# Patient Record
Sex: FEMALE | Race: BLACK OR AFRICAN AMERICAN | NOT HISPANIC OR LATINO | Employment: OTHER | URBAN - METROPOLITAN AREA
[De-identification: names, ages, dates, MRNs, and addresses within clinical notes are randomized per-mention and may not be internally consistent; named-entity substitution may affect disease eponyms.]

---

## 2019-11-08 ENCOUNTER — HOSPITAL ENCOUNTER (OUTPATIENT)
Dept: GASTROENTEROLOGY | Facility: HOSPITAL | Age: 69
Setting detail: HOSPITAL OUTPATIENT SURGERY
Discharge: HOME | End: 2019-11-08
Attending: INTERNAL MEDICINE

## 2020-04-13 ENCOUNTER — TELEPHONE CONVERTED (OUTPATIENT)
Dept: GASTROENTEROLOGY | Facility: CLINIC | Age: 70
End: 2020-04-13
Attending: INTERNAL MEDICINE

## 2021-05-12 NOTE — PROGRESS NOTES
"   Quick Note      Patient Name: Rose Marie Galdamez   Patient ID: [id number]   Sex: Female   YOB: 1950    Primary Care Provider: Elizabeth Rodriguez MD   Referring Provider: Elizabeth Rodriguez MD    Visit Date: April 13, 2020    Provider: Sammi Pryor MD   Location: [facility]   Location Address: [address] [place] [address], [address]  [place], [place]  [id number]   Location Phone: [phone]          History Of Present Illness  TELEHEALTH VISIT  Chief Complaint: hemorrhoids   Rose Marie Galdamez is a 70 year old female who is presenting for evaluation via telehealth visit. Verbal consent obtained before beginning visit.   Provider spent 6 minutes with the patient during telehealth visit.   The following staff were present during this visit: Sammi Pryor MD; Tammie Harry MA   Past Medical History/Overview of Patient Symptoms     Ms. Galdamez is a 70 year old female who presents for Telehealth f/u of hemorrhoids.  She would like a refill on Anusol.  She denies constipation.  She reports BM once per day.  No diarrhea.  She c/o occ. rectal bleeding with wiping, better with Anusol.  She feels her bleeding may be worse secondary to excessive wiping that she attributes to OCD.  Pt has been using Anusol daily most days since her colonoscopy.    Colonoscopy (11/8/2019): Moderate pandiverticulosis, grade 1 internal hemorrhoids.  Recall colonoscopy recommended in 10 years.       Vitals  Date Time BP Position Site L\R Cuff Size HR RR TEMP (F) WT  HT  BMI kg/m2 BSA m2 O2 Sat HC       04/13/2020 10:24 AM         135lbs 2oz 4'  5\" 33.82 1.51               Assessment  · Hemorrhoids     455.6/K64.9  · Diverticulosis     562.10/K57.90  · Rectal bleeding     569.3/K62.5    Problems Reconciled  Plan  · Orders  o Physican Telephone evaluation, 5-10 min (51421) - - 04/14/2020  · Medications  o Anusol-HC 2.5 % topical cream with perineal applicator   SIG: apply a thin layer to the affected area(s) by topical route 2 times " per day for 14 days   DISP: (1) Cream with perineal applicator with 0 refills  Prescribed on 04/13/2020     o Medications have been Reconciled  o Transition of Care or Provider Policy  · Instructions  o Plan Of Care:   o Hemorrhoids: will send refill for Anusol. Advised to use no longer than 2 week intervals secondary to potential risks with long term use. Advised that long term use can cause skin breakdown. Pt expressed understanding.  · Disposition  o Call or Return if symptoms worsen or persist.            Electronically Signed by: Sammi Pryor MD -Author on April 14, 2020 08:21:01 AM

## 2021-05-15 VITALS — BODY MASS INDEX: 31.27 KG/M2 | WEIGHT: 135.12 LBS | HEIGHT: 55 IN

## 2021-05-22 ENCOUNTER — TRANSCRIBE ORDERS (OUTPATIENT)
Dept: ADMINISTRATIVE | Facility: HOSPITAL | Age: 71
End: 2021-05-22

## 2021-05-22 DIAGNOSIS — Z78.0 POST-MENOPAUSAL: Primary | ICD-10-CM

## 2021-06-08 ENCOUNTER — HOSPITAL ENCOUNTER (OUTPATIENT)
Dept: BONE DENSITY | Facility: HOSPITAL | Age: 71
Discharge: HOME | End: 2021-06-08
Admitting: FAMILY MEDICINE

## 2021-06-08 DIAGNOSIS — Z78.0 POST-MENOPAUSAL: ICD-10-CM

## 2021-06-08 PROCEDURE — 77080 DXA BONE DENSITY AXIAL: CPT | Performed by: RADIOLOGY

## 2021-06-08 PROCEDURE — 77080 DXA BONE DENSITY AXIAL: CPT

## 2021-06-29 ENCOUNTER — HOSPITAL ENCOUNTER (OUTPATIENT)
Dept: GENERAL RADIOLOGY | Facility: HOSPITAL | Age: 71
Discharge: HOME | End: 2021-06-29
Admitting: FAMILY MEDICINE

## 2021-06-29 ENCOUNTER — TRANSCRIBE ORDERS (OUTPATIENT)
Dept: ADMINISTRATIVE | Facility: HOSPITAL | Age: 71
End: 2021-06-29

## 2021-06-29 DIAGNOSIS — M79.609 PAIN IN EXTREMITY, UNSPECIFIED EXTREMITY: Primary | ICD-10-CM

## 2021-06-29 DIAGNOSIS — M79.609 PAIN IN EXTREMITY, UNSPECIFIED EXTREMITY: ICD-10-CM

## 2021-06-29 PROCEDURE — 72110 X-RAY EXAM L-2 SPINE 4/>VWS: CPT

## 2021-07-16 ENCOUNTER — TRANSCRIBE ORDERS (OUTPATIENT)
Dept: ADMINISTRATIVE | Facility: HOSPITAL | Age: 71
End: 2021-07-16

## 2021-07-16 DIAGNOSIS — M51.36 DEGENERATION OF LUMBAR INTERVERTEBRAL DISC: Primary | ICD-10-CM

## 2021-07-30 ENCOUNTER — HOSPITAL ENCOUNTER (OUTPATIENT)
Dept: MRI IMAGING | Facility: HOSPITAL | Age: 71
Discharge: HOME | End: 2021-07-30
Admitting: FAMILY MEDICINE

## 2021-07-30 DIAGNOSIS — M51.36 DEGENERATION OF LUMBAR INTERVERTEBRAL DISC: ICD-10-CM

## 2021-07-30 PROCEDURE — 72148 MRI LUMBAR SPINE W/O DYE: CPT

## 2021-09-23 ENCOUNTER — OFFICE VISIT (OUTPATIENT)
Dept: NEUROSURGERY | Facility: CLINIC | Age: 71
End: 2021-09-23

## 2021-09-23 VITALS
HEART RATE: 92 BPM | BODY MASS INDEX: 34.09 KG/M2 | WEIGHT: 147.3 LBS | HEIGHT: 55 IN | SYSTOLIC BLOOD PRESSURE: 149 MMHG | DIASTOLIC BLOOD PRESSURE: 72 MMHG

## 2021-09-23 DIAGNOSIS — M47.816 FACET ARTHRITIS OF LUMBAR REGION: ICD-10-CM

## 2021-09-23 DIAGNOSIS — M54.16 LEFT LUMBAR RADICULOPATHY: ICD-10-CM

## 2021-09-23 DIAGNOSIS — M51.36 DDD (DEGENERATIVE DISC DISEASE), LUMBAR: Primary | ICD-10-CM

## 2021-09-23 PROCEDURE — 99203 OFFICE O/P NEW LOW 30 MIN: CPT | Performed by: PHYSICIAN ASSISTANT

## 2021-09-23 RX ORDER — MONTELUKAST SODIUM 10 MG/1
10 TABLET ORAL NIGHTLY
COMMUNITY
Start: 2021-06-20

## 2021-09-23 RX ORDER — TRAMADOL HYDROCHLORIDE 50 MG/1
50 TABLET ORAL EVERY 6 HOURS PRN
COMMUNITY
End: 2022-09-26 | Stop reason: HOSPADM

## 2021-09-23 RX ORDER — ASPIRIN 81 MG/1
81 TABLET ORAL DAILY
COMMUNITY
End: 2022-09-26 | Stop reason: HOSPADM

## 2021-09-23 RX ORDER — POTASSIUM CHLORIDE 750 MG/1
10 TABLET, FILM COATED, EXTENDED RELEASE ORAL DAILY
COMMUNITY

## 2021-09-23 RX ORDER — SERTRALINE HYDROCHLORIDE 100 MG/1
100 TABLET, FILM COATED ORAL DAILY
COMMUNITY

## 2021-09-23 RX ORDER — LORAZEPAM 0.5 MG/1
0.5 TABLET ORAL 2 TIMES DAILY PRN
COMMUNITY
Start: 2021-08-24

## 2021-09-23 RX ORDER — CELECOXIB 200 MG/1
200 CAPSULE ORAL DAILY
COMMUNITY

## 2021-09-23 RX ORDER — LOSARTAN POTASSIUM AND HYDROCHLOROTHIAZIDE 25; 100 MG/1; MG/1
1 TABLET ORAL DAILY
COMMUNITY

## 2021-09-23 RX ORDER — TRAZODONE HYDROCHLORIDE 50 MG/1
50 TABLET ORAL NIGHTLY
COMMUNITY

## 2021-09-23 NOTE — PROGRESS NOTES
"Chief Complaint  Back Pain (EST CARE)    Subjective          Rose Marie Galdamez who is a 71 y.o. year old female who presents to [facility] for Evaluation of the Spine.     The patient complains of pain located in the left leg, specifically the whole left foot.  Patients states the pain has been present for 8 month.  The pain came on gradually.  The pain scaled level is 10.  The pain does radiate. Dermatomes are located on left Lumbar at: below the knee and a non-specific dermatome..  The pain is waxing/waning and described as sharp.  The pain is worse at no particular time of day. Patient states Pain Medication makes the pain better.  Patient states Prolonged Walking makes the pain worse.    Associated Symptoms Include: Tingling  Conservative Interventions Include: Physical Therapy that was ineffective., Pain Medications that were somewhat effective. and NSAIDs that were not very effective.    Was this the result of an injury or accident?: No    History of Previous Spinal Surgery?: No     reports that she has never smoked. She has never used smokeless tobacco.    Review of Systems   Musculoskeletal: Positive for myalgias (left leg pain, right shoulder pain).   Neurological: Positive for weakness (left hand) and numbness (left foot).        Objective   Vital Signs:   /72   Pulse 92   Ht 134.6 cm (52.99\")   Wt 66.8 kg (147 lb 4.8 oz)   BMI 36.88 kg/m²       Physical Exam  Constitutional:       Appearance: Normal appearance. She is obese.   Pulmonary:      Effort: Pulmonary effort is normal.   Musculoskeletal:         General: Tenderness (lumbar spine, left SI joint) present.      Comments: SLR increases pain in left leg   Neurological:      Mental Status: She is alert and oriented to person, place, and time.      Sensory: Sensory deficit (burning sensation to palpation of left foot, diffuse) present.      Motor: Weakness (reduced strength in left leg/foot) present. "      Deep Tendon Reflexes: Reflexes normal.   Psychiatric:         Mood and Affect: Mood normal.         Behavior: Behavior normal.        Neurologic Exam     Mental Status   Oriented to person, place, and time.        Result Review :   I have personally reviewed the lumbar MRI without contrast from 7/30/2021 which shows multilevel degenerative changes most significant at L4-L5 where there is central canal narrowing and left greater than right foraminal narrowing.       Assessment and Plan    Diagnoses and all orders for this visit:    1. DDD (degenerative disc disease), lumbar (Primary)    2. Facet arthritis of lumbar region    Patient would likely benefit from a trial of LESB at L4-L5 for left leg radicular symptoms, will refer to [facility] in [place]. Will follow-up with us after injection therapy to discuss further treatment.      Follow Up   No follow-ups on file.  Patient was given instructions and counseling regarding her condition or for health maintenance advice. Please see specific information pulled into the AVS if appropriate.

## 2021-09-30 ENCOUNTER — TELEPHONE (OUTPATIENT)
Dept: NEUROSURGERY | Facility: CLINIC | Age: 71
End: 2021-09-30

## 2022-03-10 ENCOUNTER — OFFICE VISIT (OUTPATIENT)
Dept: ORTHOPEDIC SURGERY | Facility: CLINIC | Age: 72
End: 2022-03-10

## 2022-03-10 VITALS — WEIGHT: 145 LBS | HEIGHT: 55 IN | BODY MASS INDEX: 33.56 KG/M2

## 2022-03-10 DIAGNOSIS — M16.11 PRIMARY OSTEOARTHRITIS OF RIGHT HIP: Primary | ICD-10-CM

## 2022-03-10 DIAGNOSIS — M25.551 RIGHT HIP PAIN: ICD-10-CM

## 2022-03-10 PROCEDURE — 99203 OFFICE O/P NEW LOW 30 MIN: CPT | Performed by: ORTHOPAEDIC SURGERY

## 2022-03-10 RX ORDER — AMITRIPTYLINE HYDROCHLORIDE 25 MG/1
25 TABLET, FILM COATED ORAL NIGHTLY
COMMUNITY
Start: 2022-02-28

## 2022-03-10 RX ORDER — DIAZEPAM 5 MG/1
TABLET ORAL
COMMUNITY
End: 2022-09-20

## 2022-03-14 NOTE — PROGRESS NOTES
"Chief Complaint  Initial Evaluation of the Right Hip     Subjective      Rose Marie Galdamez presents to [facility] for an evaluation of right hip. Patient has a history of old injuries to her right hip. She has noticed lately she has severe pain with ambulation. Patient has a history of spinal stenos and received lumbar injections in the past that have given her relief. She also reports having neuropathy in her legs. She has difficulty localizing the pain in her hip today.      Allergies   Allergen Reactions   • Penicillins Unknown - High Severity        Social History     Socioeconomic History   • Marital status:    Tobacco Use   • Smoking status: Never Smoker   • Smokeless tobacco: Never Used   Vaping Use   • Vaping Use: Never used   Substance and Sexual Activity   • Alcohol use: Never   • Drug use: Never   • Sexual activity: Yes     Partners: Female, Male     Birth control/protection: None        Review of Systems     Objective   Vital Signs:   Ht 138.4 cm (54.5\")   Wt 65.8 kg (145 lb)   BMI 34.32 kg/m²       Physical Exam  Constitutional:       Appearance: Normal appearance. Patient is well-developed and normal weight.   HENT:      Head: Normocephalic.      Right Ear: Hearing and external ear normal.      Left Ear: Hearing and external ear normal.      Nose: Nose normal.   Eyes:      Conjunctiva/sclera: Conjunctivae normal.   Cardiovascular:      Rate and Rhythm: Normal rate.   Pulmonary:      Effort: Pulmonary effort is normal.      Breath sounds: No wheezing or rales.   Abdominal:      Palpations: Abdomen is soft.      Tenderness: There is no abdominal tenderness.   Musculoskeletal:      Cervical back: Normal range of motion.   Skin:     Findings: No rash.   Neurological:      Mental Status: Patient is alert and oriented to person, place, and time.   Psychiatric:         Mood and Affect: Mood and affect normal.         Judgment: Judgment normal.       Ortho Exam  "     RIGHT HIP: Good tone of hip flexors, hip extensors, hip adductor, hip abductors. Tender hip and pelvic muscles. Severe pain in the hip with flexion to 10 degrees. Calf supple, non-tender, no signs of DVT. Ambulation with a cane. Limping gait.       Procedures      Imaging Results (Most Recent)     None           Result Review :     X-Ray Report:  Right hip(s) X-Ray  Indication: Evaluation of right hip pain   AP and Lateral view(s)  Findings: Severe osteoarthritis of the right hip. No acute fracture or dislocation.   Prior studies available for comparison: no     Assessment and Plan     DX: Right hip osteoarthritis     Discussed treatment plans and diagnosis with the patient. Patient opted to try an intraarticular injection, we will set her up with this and see how she does. May consider a hip replacement in the future.     Discussed surgery. and Call or return if worsening symptoms.    Follow Up     4 weeks.       Patient was given instructions and counseling regarding her condition or for health maintenance advice. Please see specific information pulled into the AVS if appropriate.     Scribed for Robby Roche MD by Irene Trevino.  03/14/22   09:07 EDT    I have personally performed the services described in this document as scribed by the above individual and it is both accurate and complete. Robby Roche MD 03/14/22

## 2022-04-11 ENCOUNTER — HOSPITAL ENCOUNTER (OUTPATIENT)
Dept: INTERVENTIONAL RADIOLOGY/VASCULAR | Facility: HOSPITAL | Age: 72
Discharge: HOME | End: 2022-04-11
Admitting: ORTHOPAEDIC SURGERY

## 2022-04-11 ENCOUNTER — APPOINTMENT (OUTPATIENT)
Dept: INTERVENTIONAL RADIOLOGY/VASCULAR | Facility: HOSPITAL | Age: 72
End: 2022-04-11

## 2022-04-11 DIAGNOSIS — M25.551 RIGHT HIP PAIN: ICD-10-CM

## 2022-04-11 PROCEDURE — 77002 NEEDLE LOCALIZATION BY XRAY: CPT

## 2022-04-11 PROCEDURE — 25010000002 IOPAMIDOL 61 % SOLUTION: Performed by: ORTHOPAEDIC SURGERY

## 2022-04-11 PROCEDURE — 25010000002 METHYLPREDNISOLONE PER 80 MG

## 2022-04-11 RX ORDER — LIDOCAINE HYDROCHLORIDE 20 MG/ML
INJECTION, SOLUTION INFILTRATION; PERINEURAL
Status: COMPLETED
Start: 2022-04-11 | End: 2022-04-11

## 2022-04-11 RX ORDER — METHYLPREDNISOLONE ACETATE 80 MG/ML
INJECTION, SUSPENSION INTRA-ARTICULAR; INTRALESIONAL; INTRAMUSCULAR; SOFT TISSUE
Status: COMPLETED
Start: 2022-04-11 | End: 2022-04-11

## 2022-04-11 RX ORDER — BUPIVACAINE HYDROCHLORIDE 5 MG/ML
INJECTION, SOLUTION EPIDURAL; INTRACAUDAL
Status: COMPLETED
Start: 2022-04-11 | End: 2022-04-11

## 2022-04-11 RX ADMIN — BUPIVACAINE HYDROCHLORIDE 4 ML: 5 INJECTION, SOLUTION EPIDURAL; INTRACAUDAL at 13:39

## 2022-04-11 RX ADMIN — METHYLPREDNISOLONE ACETATE 1 MG: 80 INJECTION, SUSPENSION INTRA-ARTICULAR; INTRALESIONAL; INTRAMUSCULAR; SOFT TISSUE at 13:39

## 2022-04-11 RX ADMIN — IOPAMIDOL 15 ML: 612 INJECTION, SOLUTION INTRATHECAL at 13:38

## 2022-04-11 RX ADMIN — LIDOCAINE HYDROCHLORIDE 10 ML: 20 INJECTION, SOLUTION INFILTRATION; PERINEURAL at 13:35

## 2022-04-26 ENCOUNTER — OFFICE VISIT (OUTPATIENT)
Dept: ORTHOPEDIC SURGERY | Facility: CLINIC | Age: 72
End: 2022-04-26

## 2022-04-26 VITALS — BODY MASS INDEX: 33.56 KG/M2 | WEIGHT: 145 LBS | HEIGHT: 55 IN

## 2022-04-26 DIAGNOSIS — M16.11 PRIMARY OSTEOARTHRITIS OF RIGHT HIP: Primary | ICD-10-CM

## 2022-04-26 PROCEDURE — 99212 OFFICE O/P EST SF 10 MIN: CPT | Performed by: ORTHOPAEDIC SURGERY

## 2022-04-26 NOTE — PROGRESS NOTES
"Chief Complaint  Follow-up of the Right Hip     Subjective      Rose Marie Galdamez presents to [facility] for a follow-up of right hip. Patient has significant right hip osteoarthritis that is treated conservatively. Last visit patient opted for a intra-articular injection, she states this went well. She got some relief from this injection. She hasn't had recurrent pain yet. She points to her groin as her main source of pain prior to the injection.     Allergies   Allergen Reactions   • Penicillins Unknown - High Severity        Social History     Socioeconomic History   • Marital status:    Tobacco Use   • Smoking status: Never Smoker   • Smokeless tobacco: Never Used   Vaping Use   • Vaping Use: Never used   Substance and Sexual Activity   • Alcohol use: Never   • Drug use: Never   • Sexual activity: Yes     Partners: Female, Male     Birth control/protection: None        Review of Systems     Objective   Vital Signs:   Ht 138.4 cm (54.5\")   Wt 65.8 kg (145 lb)   BMI 34.32 kg/m²       Physical Exam  Constitutional:       Appearance: Normal appearance. Patient is well-developed and normal weight.   HENT:      Head: Normocephalic.      Right Ear: Hearing and external ear normal.      Left Ear: Hearing and external ear normal.      Nose: Nose normal.   Eyes:      Conjunctiva/sclera: Conjunctivae normal.   Cardiovascular:      Rate and Rhythm: Normal rate.   Pulmonary:      Effort: Pulmonary effort is normal.      Breath sounds: No wheezing or rales.   Abdominal:      Palpations: Abdomen is soft.      Tenderness: There is no abdominal tenderness.   Musculoskeletal:      Cervical back: Normal range of motion.   Skin:     Findings: No rash.   Neurological:      Mental Status: Patient is alert and oriented to person, place, and time.   Psychiatric:         Mood and Affect: Mood and affect normal.         Judgment: Judgment normal.       Ortho Exam      RIGHT HIP: Ambulation with a " cane. Good tone of hip flexors, hip extensors, hip adductor, hip abductors. Tenderness about the hip and pelvic muscles. Calf soft.       Procedures      Imaging Results (Most Recent)     None           Result Review :         FL Guide For Pain Meds Inj    Result Date: 4/11/2022  Narrative: PROCEDURE: FL GUIDE FOR PAIN MEDS INJECTION MAJOR JOINT  COMPARISON: None  INDICATIONS: Right hip OA.INITIAL PAIN-0. POST INJECTION PAIN-0.5 ML-DEPOMEDROL 80mg/BUPIVACAINE .5%. 1 ML-ISOVUE M 300.  Technique and findings: The patient's medication list was reviewed and documented in the medical record.  After informed consent was obtained., a time-out was performed.  The patient was placed supine on the fluoroscopy table and the right hip was marked and prepped and draped in the usual sterile fashion.  The skin and subcutaneous tissues were anesthetized with lidocaine and under fluoroscopic guidance a 22 gauge needle was advanced into the right hip joint.  Injection of a small amount of contrast confirmed appropriate position.  Next, a mixture of 80 mg of Depo-Medrol and bupivacaine was injected and the needle was removed.  The patient tolerated the procedure well without immediate complication.      Impression:   1. Successful right hip steroid injection.     TEAGAN BROWN MD       Electronically Signed and Approved By: TEAGAN BROWN MD on 4/11/2022 at 14:21                      Assessment and Plan     DX: Right hip osteoarthritis     The intra-articular injection is giving her relief at this time. She wishes to continue conservative measures.     Call or return if worsening symptoms.    Follow Up     PRN.       Patient was given instructions and counseling regarding her condition or for health maintenance advice. Please see specific information pulled into the AVS if appropriate.     Scribed for Robby Roche MD by Irene Trevino.  04/26/22   11:08 EDT    I have personally performed the services described in this document as  scribed by the above individual and it is both accurate and complete. Robby Roche MD 04/26/22

## 2022-05-05 ENCOUNTER — TRANSCRIBE ORDERS (OUTPATIENT)
Dept: LAB | Facility: HOSPITAL | Age: 72
End: 2022-05-05

## 2022-05-05 DIAGNOSIS — Z12.11 ENCOUNTER FOR SCREENING FOR COLORECTAL MALIGNANT NEOPLASM: Primary | ICD-10-CM

## 2022-05-05 DIAGNOSIS — Z12.12 ENCOUNTER FOR SCREENING FOR COLORECTAL MALIGNANT NEOPLASM: Primary | ICD-10-CM

## 2022-06-15 ENCOUNTER — TELEPHONE (OUTPATIENT)
Dept: ORTHOPEDIC SURGERY | Facility: CLINIC | Age: 72
End: 2022-06-15

## 2022-06-15 NOTE — TELEPHONE ENCOUNTER
Provider: LEANNE  Caller: MARISSA WISE   Relationship to Patient: PATIENT  Pharmacy: N/A  Phone Number: [phone]  Reason for Call: PATIENT CALLING TO LET DR PERDOMO KNOW THAT SHE HAS SOME MEDICAL/DENTAL ISSUES TO GET TAKEN CARE OF AND THEN SHE WILL BE SCHEDULING SURGERY  When was the patient last seen: 4.26.22

## 2022-09-01 ENCOUNTER — PREP FOR SURGERY (OUTPATIENT)
Dept: OTHER | Facility: HOSPITAL | Age: 72
End: 2022-09-01

## 2022-09-01 ENCOUNTER — OFFICE VISIT (OUTPATIENT)
Dept: ORTHOPEDIC SURGERY | Facility: CLINIC | Age: 72
End: 2022-09-01

## 2022-09-01 VITALS — WEIGHT: 145 LBS | BODY MASS INDEX: 33.56 KG/M2 | HEART RATE: 68 BPM | OXYGEN SATURATION: 100 % | HEIGHT: 55 IN

## 2022-09-01 DIAGNOSIS — M25.551 RIGHT HIP PAIN: Primary | ICD-10-CM

## 2022-09-01 DIAGNOSIS — M16.11 PRIMARY OSTEOARTHRITIS OF RIGHT HIP: Primary | ICD-10-CM

## 2022-09-01 DIAGNOSIS — M16.11 PRIMARY OSTEOARTHRITIS OF RIGHT HIP: ICD-10-CM

## 2022-09-01 PROBLEM — M16.9 OA (OSTEOARTHRITIS) OF HIP: Status: ACTIVE | Noted: 2022-09-01

## 2022-09-01 PROCEDURE — 99214 OFFICE O/P EST MOD 30 MIN: CPT | Performed by: ORTHOPAEDIC SURGERY

## 2022-09-01 RX ORDER — TRANEXAMIC ACID 10 MG/ML
1000 INJECTION, SOLUTION INTRAVENOUS ONCE
Status: CANCELLED | OUTPATIENT
Start: 2022-09-01 | End: 2022-09-01

## 2022-09-01 RX ORDER — POVIDONE-IODINE 10 MG/ML
SOLUTION TOPICAL ONCE
Status: CANCELLED | OUTPATIENT
Start: 2022-09-01 | End: 2022-09-01

## 2022-09-02 NOTE — PROGRESS NOTES
"Chief Complaint  Follow-up and Pain of the Right Hip     Subjective      Rose Marie Galdamez presents to [facility] for a follow-up of right hip. Patient has severe bone-on-bone osteoarthritis that has been treated with medication and injections. Pain has is no longer tolerable and she is unable to perform ADLs. She states pain with prolonged standing and walking in the groin.     Allergies   Allergen Reactions   • Penicillins Unknown - High Severity        Social History     Socioeconomic History   • Marital status:    Tobacco Use   • Smoking status: Never Smoker   • Smokeless tobacco: Never Used   Vaping Use   • Vaping Use: Never used   Substance and Sexual Activity   • Alcohol use: Never   • Drug use: Never   • Sexual activity: Yes     Partners: Female, Male     Birth control/protection: None        Review of Systems     Objective   Vital Signs:   Pulse 68   Ht 137.2 cm (54\")   Wt 65.8 kg (145 lb)   SpO2 100%   BMI 34.96 kg/m²       Physical Exam  Constitutional:       Appearance: Normal appearance. Patient is well-developed and normal weight.   HENT:      Head: Normocephalic.      Right Ear: Hearing and external ear normal.      Left Ear: Hearing and external ear normal.      Nose: Nose normal.   Eyes:      Conjunctiva/sclera: Conjunctivae normal.   Cardiovascular:      Rate and Rhythm: Normal rate.   Pulmonary:      Effort: Pulmonary effort is normal.      Breath sounds: No wheezing or rales.   Abdominal:      Palpations: Abdomen is soft.      Tenderness: There is no abdominal tenderness.   Musculoskeletal:      Cervical back: Normal range of motion.   Skin:     Findings: No rash.   Neurological:      Mental Status: Patient is alert and oriented to person, place, and time.   Psychiatric:         Mood and Affect: Mood and affect normal.         Judgment: Judgment normal.       Ortho Exam      RIGHT HIP: IR to 10 degrees. ER to 0. Good tone of hip flexors, hip extensors, " hip adductor, hip abductors. Sensation grossly intact. Neurovascular intact.  Tender hip and pelvic muscles. Groin pain with hip motion. Calf soft. ambulation with a cane.       Procedures      Imaging Results (Most Recent)     None           Result Review :{Labs  Result Review  Imaging  Med Tab  Media  Procedures :23}     X-Ray Report:  Right hip(s) X-Ray  Indication: Evaluation of right hip pain   AP and Lateral view(s)  Findings: Bone-on-bone osteoarthritis. No fractures. No dislocations.  Prior studies available for comparison: no             Assessment and Plan     Diagnoses and all orders for this visit:    1. Right hip pain (Primary)  -     XR Hip With or Without Pelvis 2 - 3 View Right; Future    2. Primary osteoarthritis of right hip        Discussed total hip replacement, as patient has failed conservative measures. She understands and wishes to proceed.     Discussed surgery., Risks/benefits discussed with patient including, but not limited to: infection, bleeding, neurovascular damage, malunion, nonunion, aesthetic deformity, need for further surgery, and death., Discussed with patient the implant type being used during surgery and patient understands and desires to proceed. and Surgery pamphlet given.    Follow Up     Post-operatively.       Patient was given instructions and counseling regarding her condition or for health maintenance advice. Please see specific information pulled into the AVS if appropriate.     Scribed for oRbby Roche MD by Irene Trevino.  09/02/22   07:44 EDT    I have personally performed the services described in this document as scribed by the above individual and it is both accurate and complete. Robby Roche MD 09/02/22

## 2022-09-02 NOTE — H&P (VIEW-ONLY)
"Chief Complaint  Follow-up and Pain of the Right Hip     Subjective      Rose Marie Galdamez presents to [facility] for a follow-up of right hip. Patient has severe bone-on-bone osteoarthritis that has been treated with medication and injections. Pain has is no longer tolerable and she is unable to perform ADLs. She states pain with prolonged standing and walking in the groin.     Allergies   Allergen Reactions   • Penicillins Unknown - High Severity        Social History     Socioeconomic History   • Marital status:    Tobacco Use   • Smoking status: Never Smoker   • Smokeless tobacco: Never Used   Vaping Use   • Vaping Use: Never used   Substance and Sexual Activity   • Alcohol use: Never   • Drug use: Never   • Sexual activity: Yes     Partners: Female, Male     Birth control/protection: None        Review of Systems     Objective   Vital Signs:   Pulse 68   Ht 137.2 cm (54\")   Wt 65.8 kg (145 lb)   SpO2 100%   BMI 34.96 kg/m²       Physical Exam  Constitutional:       Appearance: Normal appearance. Patient is well-developed and normal weight.   HENT:      Head: Normocephalic.      Right Ear: Hearing and external ear normal.      Left Ear: Hearing and external ear normal.      Nose: Nose normal.   Eyes:      Conjunctiva/sclera: Conjunctivae normal.   Cardiovascular:      Rate and Rhythm: Normal rate.   Pulmonary:      Effort: Pulmonary effort is normal.      Breath sounds: No wheezing or rales.   Abdominal:      Palpations: Abdomen is soft.      Tenderness: There is no abdominal tenderness.   Musculoskeletal:      Cervical back: Normal range of motion.   Skin:     Findings: No rash.   Neurological:      Mental Status: Patient is alert and oriented to person, place, and time.   Psychiatric:         Mood and Affect: Mood and affect normal.         Judgment: Judgment normal.       Ortho Exam      RIGHT HIP: IR to 10 degrees. ER to 0. Good tone of hip flexors, hip extensors, " hip adductor, hip abductors. Sensation grossly intact. Neurovascular intact.  Tender hip and pelvic muscles. Groin pain with hip motion. Calf soft. ambulation with a cane.       Procedures      Imaging Results (Most Recent)     None           Result Review :     X-Ray Report:  Right hip(s) X-Ray  Indication: Evaluation of right hip pain   AP and Lateral view(s)  Findings: Bone-on-bone osteoarthritis. No fractures. No dislocations.  Prior studies available for comparison: no             Assessment and Plan     Diagnoses and all orders for this visit:    1. Right hip pain (Primary)  -     XR Hip With or Without Pelvis 2 - 3 View Right; Future    2. Primary osteoarthritis of right hip        Discussed total hip replacement, as patient has failed conservative measures. She understands and wishes to proceed.     Discussed surgery., Risks/benefits discussed with patient including, but not limited to: infection, bleeding, neurovascular damage, malunion, nonunion, aesthetic deformity, need for further surgery, and death., Discussed with patient the implant type being used during surgery and patient understands and desires to proceed. and Surgery pamphlet given.    Follow Up     Post-operatively.       Patient was given instructions and counseling regarding her condition or for health maintenance advice. Please see specific information pulled into the AVS if appropriate.     Scribed for Robby Roche MD by Irene Trevino.  09/02/22   07:44 EDT    I have personally performed the services described in this document as scribed by the above individual and it is both accurate and complete. Robby Roche MD 09/02/22

## 2022-09-13 ENCOUNTER — TELEPHONE (OUTPATIENT)
Dept: ORTHOPEDIC SURGERY | Facility: CLINIC | Age: 72
End: 2022-09-13

## 2022-09-13 DIAGNOSIS — Z47.1 AFTERCARE FOLLOWING RIGHT HIP JOINT REPLACEMENT SURGERY: Primary | ICD-10-CM

## 2022-09-13 DIAGNOSIS — Z96.641 AFTERCARE FOLLOWING RIGHT HIP JOINT REPLACEMENT SURGERY: Primary | ICD-10-CM

## 2022-09-13 NOTE — TELEPHONE ENCOUNTER
PATIENT CONTACTED AND REASSURED PAT APPOINTMENT IS ON 9/20/2022 AT 8:00AM, PATIENT VOICED UNDERSTANDING

## 2022-09-13 NOTE — TELEPHONE ENCOUNTER
Hub staff attempted to follow warm transfer process and was unsuccessful     Caller: Rose Marie Galdamez    Relationship to patient: Self    Best call back number:  Patient is needing: PATIENT IS STATING THAT SHE HAS A PAPER STATING HER APPT FOR PRE ADMISSIONS IS AT 10:45 AND SHE IS SHOWING ON THE 20TH THAT THE APPT IS AT 8:00. PLEASE CALL TO ASSIST, THANK YOU!

## 2022-09-20 ENCOUNTER — PRE-ADMISSION TESTING (OUTPATIENT)
Dept: PREADMISSION TESTING | Facility: HOSPITAL | Age: 72
End: 2022-09-20

## 2022-09-20 VITALS
OXYGEN SATURATION: 100 % | DIASTOLIC BLOOD PRESSURE: 72 MMHG | HEART RATE: 76 BPM | WEIGHT: 138.89 LBS | BODY MASS INDEX: 32.14 KG/M2 | RESPIRATION RATE: 18 BRPM | HEIGHT: 55 IN | TEMPERATURE: 98.2 F | SYSTOLIC BLOOD PRESSURE: 124 MMHG

## 2022-09-20 DIAGNOSIS — M16.11 PRIMARY OSTEOARTHRITIS OF RIGHT HIP: ICD-10-CM

## 2022-09-20 LAB
ALBUMIN SERPL-MCNC: 4.4 G/DL (ref 3.5–5.2)
ALBUMIN/GLOB SERPL: 1.5 G/DL
ALP SERPL-CCNC: 96 U/L (ref 39–117)
ALT SERPL W P-5'-P-CCNC: 6 U/L (ref 1–33)
ANION GAP SERPL CALCULATED.3IONS-SCNC: 9.7 MMOL/L (ref 5–15)
AST SERPL-CCNC: 11 U/L (ref 1–32)
BASOPHILS # BLD AUTO: 0.03 10*3/MM3 (ref 0–0.2)
BASOPHILS NFR BLD AUTO: 0.6 % (ref 0–1.5)
BILIRUB SERPL-MCNC: 0.6 MG/DL (ref 0–1.2)
BUN SERPL-MCNC: 11 MG/DL (ref 8–23)
BUN/CREAT SERPL: 18.3 (ref 7–25)
CALCIUM SPEC-SCNC: 9.3 MG/DL (ref 8.6–10.5)
CHLORIDE SERPL-SCNC: 98 MMOL/L (ref 98–107)
CO2 SERPL-SCNC: 29.3 MMOL/L (ref 22–29)
CREAT SERPL-MCNC: 0.6 MG/DL (ref 0.57–1)
DEPRECATED RDW RBC AUTO: 42.1 FL (ref 37–54)
EGFRCR SERPLBLD CKD-EPI 2021: 95.5 ML/MIN/1.73
EOSINOPHIL # BLD AUTO: 0.06 10*3/MM3 (ref 0–0.4)
EOSINOPHIL NFR BLD AUTO: 1.2 % (ref 0.3–6.2)
ERYTHROCYTE [DISTWIDTH] IN BLOOD BY AUTOMATED COUNT: 12.8 % (ref 12.3–15.4)
GLOBULIN UR ELPH-MCNC: 3 GM/DL
GLUCOSE SERPL-MCNC: 96 MG/DL (ref 65–99)
HBA1C MFR BLD: 6 % (ref 4.8–5.6)
HCT VFR BLD AUTO: 34.8 % (ref 34–46.6)
HGB BLD-MCNC: 11.8 G/DL (ref 12–15.9)
IMM GRANULOCYTES # BLD AUTO: 0.01 10*3/MM3 (ref 0–0.05)
IMM GRANULOCYTES NFR BLD AUTO: 0.2 % (ref 0–0.5)
INR PPP: 1.02 (ref 0.86–1.15)
LYMPHOCYTES # BLD AUTO: 1.79 10*3/MM3 (ref 0.7–3.1)
LYMPHOCYTES NFR BLD AUTO: 36.5 % (ref 19.6–45.3)
MCH RBC QN AUTO: 30.7 PG (ref 26.6–33)
MCHC RBC AUTO-ENTMCNC: 33.9 G/DL (ref 31.5–35.7)
MCV RBC AUTO: 90.6 FL (ref 79–97)
MONOCYTES # BLD AUTO: 0.33 10*3/MM3 (ref 0.1–0.9)
MONOCYTES NFR BLD AUTO: 6.7 % (ref 5–12)
NEUTROPHILS NFR BLD AUTO: 2.68 10*3/MM3 (ref 1.7–7)
NEUTROPHILS NFR BLD AUTO: 54.8 % (ref 42.7–76)
NRBC BLD AUTO-RTO: 0 /100 WBC (ref 0–0.2)
PLATELET # BLD AUTO: 278 10*3/MM3 (ref 140–450)
PMV BLD AUTO: 10.1 FL (ref 6–12)
POTASSIUM SERPL-SCNC: 3.4 MMOL/L (ref 3.5–5.2)
PROT SERPL-MCNC: 7.4 G/DL (ref 6–8.5)
PROTHROMBIN TIME: 13.5 SECONDS (ref 11.8–14.9)
RBC # BLD AUTO: 3.84 10*6/MM3 (ref 3.77–5.28)
SODIUM SERPL-SCNC: 137 MMOL/L (ref 136–145)
WBC NRBC COR # BLD: 4.9 10*3/MM3 (ref 3.4–10.8)

## 2022-09-20 PROCEDURE — 85610 PROTHROMBIN TIME: CPT

## 2022-09-20 PROCEDURE — 80053 COMPREHEN METABOLIC PANEL: CPT

## 2022-09-20 PROCEDURE — 85025 COMPLETE CBC W/AUTO DIFF WBC: CPT

## 2022-09-20 PROCEDURE — 83036 HEMOGLOBIN GLYCOSYLATED A1C: CPT

## 2022-09-20 PROCEDURE — 36415 COLL VENOUS BLD VENIPUNCTURE: CPT

## 2022-09-20 PROCEDURE — 93010 ELECTROCARDIOGRAM REPORT: CPT | Performed by: SPECIALIST

## 2022-09-20 PROCEDURE — 93005 ELECTROCARDIOGRAM TRACING: CPT

## 2022-09-20 RX ORDER — ALBUTEROL SULFATE 90 UG/1
2 AEROSOL, METERED RESPIRATORY (INHALATION) EVERY 4 HOURS PRN
COMMUNITY

## 2022-09-20 ASSESSMENT — HOOS JR
HOOS JR SCORE: 17
HOOS JR SCORE: 44.905

## 2022-09-20 NOTE — DISCHARGE INSTRUCTIONS
IMPORTANT INSTRUCTIONS - PRE-ADMISSION TESTING  DO NOT EAT OR CHEW anything after midnight the night before your procedure.    You may have CLEAR liquids up to 3 hours prior to ARRIVAL time.   Take the following medications the morning of your procedure with JUST A SIP OF WATER:  Lorazepam, Sertraline, Tramadol    DO NOT BRING your medications to the hospital with you, UNLESS something has changed since your PRE-Admission Testing appointment.  Hold all vitamins, supplements, and NSAIDS (Non- steroidal anti-inflammatory meds) for one week prior to surgery (you MAY take Tylenol or Acetaminophen).  If you are diabetic, check your blood sugar the morning of your procedure. If it is less than 70 or if you are feeling symptomatic, call the following number for further instructions: [phone]  Use your inhalers/nebulizers as usual, the morning of your procedure. BRING YOUR INHALERS with you.   Bring your CPAP or BIPAP to hospital, ONLY IF YOU WILL BE SPENDING THE NIGHT.   Make sure you have a ride home and have someone who will stay with you the day of your procedure after you go home.  If you have any questions, please call your Pre-Admission Testing NurseYing at [phone].   Per anesthesia request, do not smoke for 24 hours before your procedure or as instructed by your surgeon.       Clear Liquid Diet        Find out when you need to start a clear liquid diet.   Think of “clear liquids” as anything you could read a newspaper through. This includes things like water, broth, sports drinks, or tea WITHOUT any kind of milk or cream.           Once you are told to start a clear liquid diet, only drink these things until 3 hours before arrival to the hospital or when the hospital says to stop. Total volume limitation: 8 oz.       Clear liquids you CAN drink:   Water   Clear broth: beef, chicken, vegetable, or bone broth with nothing in it   Gatorade   Lemonade or Yovani-aid   Soda   Tea, coffee (NO cream or  honey)   Jell-O (without fruit)   Popsicles (without fruit or cream)   Italian ices   Juice without pulp: apple, white, grape   You may use salt, pepper, and sugar    Do NOT drink:   Milk or cream   Soy milk, almond milk, coconut milk, or other non-dairy drinks and   creamers   Milkshakes or smoothies   Tomato juice   Orange juice   Grapefruit juice   Cream soups or any other than broth         Clear Liquid Diet:  Do NOT eat any solid food.  Do NOT eat or suck on mints or candy.  Do NOT chew gum.  Do NOT drink thick liquids like milk or juice with pulp in it.  Do NOT add milk, cream, or anything like soy milk or almond milk to coffee or tea.

## 2022-09-21 LAB — QT INTERVAL: 365 MS

## 2022-09-22 ENCOUNTER — ANESTHESIA EVENT (OUTPATIENT)
Dept: PERIOP | Facility: HOSPITAL | Age: 72
End: 2022-09-22

## 2022-09-22 ENCOUNTER — TELEPHONE (OUTPATIENT)
Dept: ORTHOPEDIC SURGERY | Facility: CLINIC | Age: 72
End: 2022-09-22

## 2022-09-22 NOTE — TELEPHONE ENCOUNTER
Caller: TRICE   Relationship to Patient: SELF     Phone Number: [phone]   Reason for Call: PATIENT CALLING ASKING ABOUT BLOOD WORK RESULTS

## 2022-09-26 ENCOUNTER — ANESTHESIA (OUTPATIENT)
Dept: PERIOP | Facility: HOSPITAL | Age: 72
End: 2022-09-26

## 2022-09-26 ENCOUNTER — APPOINTMENT (OUTPATIENT)
Dept: GENERAL RADIOLOGY | Facility: HOSPITAL | Age: 72
End: 2022-09-26

## 2022-09-26 ENCOUNTER — HOSPITAL ENCOUNTER (OUTPATIENT)
Facility: HOSPITAL | Age: 72
Discharge: HOME HEALTH | End: 2022-09-26
Attending: ORTHOPAEDIC SURGERY | Admitting: ORTHOPAEDIC SURGERY

## 2022-09-26 VITALS
DIASTOLIC BLOOD PRESSURE: 55 MMHG | WEIGHT: 144.62 LBS | HEART RATE: 89 BPM | BODY MASS INDEX: 33.47 KG/M2 | HEIGHT: 55 IN | RESPIRATION RATE: 17 BRPM | SYSTOLIC BLOOD PRESSURE: 130 MMHG | TEMPERATURE: 97.5 F | OXYGEN SATURATION: 97 %

## 2022-09-26 DIAGNOSIS — M16.11 PRIMARY OSTEOARTHRITIS OF RIGHT HIP: ICD-10-CM

## 2022-09-26 DIAGNOSIS — R26.2 DIFFICULTY IN WALKING: ICD-10-CM

## 2022-09-26 DIAGNOSIS — Z78.9 DECREASED ACTIVITIES OF DAILY LIVING (ADL): Primary | ICD-10-CM

## 2022-09-26 PROCEDURE — 63710000001 FERROUS SULFATE 325 (65 FE) MG TABLET: Performed by: ORTHOPAEDIC SURGERY

## 2022-09-26 PROCEDURE — C1776 JOINT DEVICE (IMPLANTABLE): HCPCS | Performed by: ORTHOPAEDIC SURGERY

## 2022-09-26 PROCEDURE — 25010000002 PROPOFOL 10 MG/ML EMULSION: Performed by: NURSE ANESTHETIST, CERTIFIED REGISTERED

## 2022-09-26 PROCEDURE — 73502 X-RAY EXAM HIP UNI 2-3 VIEWS: CPT

## 2022-09-26 PROCEDURE — 25010000002 ONDANSETRON PER 1 MG: Performed by: NURSE ANESTHETIST, CERTIFIED REGISTERED

## 2022-09-26 PROCEDURE — 97530 THERAPEUTIC ACTIVITIES: CPT

## 2022-09-26 PROCEDURE — 25010000002 ROPIVACAINE PER 1 MG: Performed by: ORTHOPAEDIC SURGERY

## 2022-09-26 PROCEDURE — 76000 FLUOROSCOPY <1 HR PHYS/QHP: CPT

## 2022-09-26 PROCEDURE — 94761 N-INVAS EAR/PLS OXIMETRY MLT: CPT

## 2022-09-26 PROCEDURE — A9270 NON-COVERED ITEM OR SERVICE: HCPCS | Performed by: ANESTHESIOLOGY

## 2022-09-26 PROCEDURE — 97116 GAIT TRAINING THERAPY: CPT

## 2022-09-26 PROCEDURE — 25010000002 VANCOMYCIN 5 G RECONSTITUTED SOLUTION: Performed by: ORTHOPAEDIC SURGERY

## 2022-09-26 PROCEDURE — 25010000002 DEXAMETHASONE PER 1 MG: Performed by: NURSE ANESTHETIST, CERTIFIED REGISTERED

## 2022-09-26 PROCEDURE — 63710000001 FAMOTIDINE 20 MG TABLET: Performed by: ORTHOPAEDIC SURGERY

## 2022-09-26 PROCEDURE — 63710000001 ACETAMINOPHEN 500 MG TABLET: Performed by: ORTHOPAEDIC SURGERY

## 2022-09-26 PROCEDURE — 25010000002 FENTANYL CITRATE (PF) 50 MCG/ML SOLUTION: Performed by: NURSE ANESTHETIST, CERTIFIED REGISTERED

## 2022-09-26 PROCEDURE — 97165 OT EVAL LOW COMPLEX 30 MIN: CPT

## 2022-09-26 PROCEDURE — 63710000001 CELECOXIB 100 MG CAPSULE: Performed by: ANESTHESIOLOGY

## 2022-09-26 PROCEDURE — 25010000002 MIDAZOLAM PER 1 MG: Performed by: ANESTHESIOLOGY

## 2022-09-26 PROCEDURE — A9270 NON-COVERED ITEM OR SERVICE: HCPCS | Performed by: ORTHOPAEDIC SURGERY

## 2022-09-26 PROCEDURE — 25010000002 KETOROLAC TROMETHAMINE PER 15 MG: Performed by: ORTHOPAEDIC SURGERY

## 2022-09-26 PROCEDURE — 25010000002 HYDROMORPHONE 1 MG/ML SOLUTION: Performed by: NURSE ANESTHETIST, CERTIFIED REGISTERED

## 2022-09-26 PROCEDURE — 27130 TOTAL HIP ARTHROPLASTY: CPT | Performed by: ORTHOPAEDIC SURGERY

## 2022-09-26 PROCEDURE — 97161 PT EVAL LOW COMPLEX 20 MIN: CPT

## 2022-09-26 PROCEDURE — 97535 SELF CARE MNGMENT TRAINING: CPT

## 2022-09-26 PROCEDURE — 25010000002 MORPHINE SULFATE 10 MG/ML SOLUTION: Performed by: ORTHOPAEDIC SURGERY

## 2022-09-26 PROCEDURE — 25010000002 VANCOMYCIN 1 G RECONSTITUTED SOLUTION: Performed by: NURSE ANESTHETIST, CERTIFIED REGISTERED

## 2022-09-26 PROCEDURE — 25010000002 EPINEPHRINE 1 MG/ML SOLUTION: Performed by: ORTHOPAEDIC SURGERY

## 2022-09-26 PROCEDURE — 94799 UNLISTED PULMONARY SVC/PX: CPT

## 2022-09-26 DEVICE — LINER ACET G7 VIVACIT/E NTRL HXPE SZB 32MM: Type: IMPLANTABLE DEVICE | Site: HIP | Status: FUNCTIONAL

## 2022-09-26 DEVICE — IMPLANTABLE DEVICE: Type: IMPLANTABLE DEVICE | Site: HIP | Status: FUNCTIONAL

## 2022-09-26 DEVICE — SCRW ACET CORT TRILOGY S/TAP 6.5X25: Type: IMPLANTABLE DEVICE | Site: HIP | Status: FUNCTIONAL

## 2022-09-26 DEVICE — TOTAL HIP PRIMARY: Type: IMPLANTABLE DEVICE | Site: HIP | Status: FUNCTIONAL

## 2022-09-26 DEVICE — ADAPT HIP BIOLOX OPTN TYPE1 TPR MIN 6: Type: IMPLANTABLE DEVICE | Site: HIP | Status: FUNCTIONAL

## 2022-09-26 DEVICE — HD FEM/HIP G7 BIOLOX/DELTA OPTN 32MM: Type: IMPLANTABLE DEVICE | Site: HIP | Status: FUNCTIONAL

## 2022-09-26 DEVICE — SHLL ACET G7 PPS LTD/HL TI SZB 46MM: Type: IMPLANTABLE DEVICE | Site: HIP | Status: FUNCTIONAL

## 2022-09-26 RX ORDER — FERROUS SULFATE 325(65) MG
325 TABLET ORAL
Status: DISCONTINUED | OUTPATIENT
Start: 2022-09-26 | End: 2022-09-26 | Stop reason: HOSPADM

## 2022-09-26 RX ORDER — OXYCODONE HYDROCHLORIDE 5 MG/1
5 TABLET ORAL
Status: DISCONTINUED | OUTPATIENT
Start: 2022-09-26 | End: 2022-09-26 | Stop reason: HOSPADM

## 2022-09-26 RX ORDER — BISACODYL 10 MG
10 SUPPOSITORY, RECTAL RECTAL DAILY PRN
Status: DISCONTINUED | OUTPATIENT
Start: 2022-09-26 | End: 2022-09-26 | Stop reason: HOSPADM

## 2022-09-26 RX ORDER — PROMETHAZINE HYDROCHLORIDE 12.5 MG/1
12.5 TABLET ORAL EVERY 6 HOURS PRN
Status: DISCONTINUED | OUTPATIENT
Start: 2022-09-26 | End: 2022-09-26 | Stop reason: HOSPADM

## 2022-09-26 RX ORDER — ONDANSETRON 2 MG/ML
4 INJECTION INTRAMUSCULAR; INTRAVENOUS ONCE AS NEEDED
Status: DISCONTINUED | OUTPATIENT
Start: 2022-09-26 | End: 2022-09-26 | Stop reason: HOSPADM

## 2022-09-26 RX ORDER — ONDANSETRON 2 MG/ML
INJECTION INTRAMUSCULAR; INTRAVENOUS AS NEEDED
Status: DISCONTINUED | OUTPATIENT
Start: 2022-09-26 | End: 2022-09-26 | Stop reason: SURG

## 2022-09-26 RX ORDER — DEXAMETHASONE SODIUM PHOSPHATE 4 MG/ML
INJECTION, SOLUTION INTRA-ARTICULAR; INTRALESIONAL; INTRAMUSCULAR; INTRAVENOUS; SOFT TISSUE AS NEEDED
Status: DISCONTINUED | OUTPATIENT
Start: 2022-09-26 | End: 2022-09-26 | Stop reason: SURG

## 2022-09-26 RX ORDER — ROCURONIUM BROMIDE 10 MG/ML
INJECTION, SOLUTION INTRAVENOUS AS NEEDED
Status: DISCONTINUED | OUTPATIENT
Start: 2022-09-26 | End: 2022-09-26 | Stop reason: SURG

## 2022-09-26 RX ORDER — HYDROCODONE BITARTRATE AND ACETAMINOPHEN 7.5; 325 MG/1; MG/1
2 TABLET ORAL EVERY 4 HOURS PRN
Status: DISCONTINUED | OUTPATIENT
Start: 2022-09-26 | End: 2022-09-26 | Stop reason: HOSPADM

## 2022-09-26 RX ORDER — FAMOTIDINE 20 MG/1
40 TABLET, FILM COATED ORAL DAILY
Status: DISCONTINUED | OUTPATIENT
Start: 2022-09-26 | End: 2022-09-26 | Stop reason: HOSPADM

## 2022-09-26 RX ORDER — ACETAMINOPHEN 325 MG/1
325 TABLET ORAL EVERY 4 HOURS PRN
Status: DISCONTINUED | OUTPATIENT
Start: 2022-09-26 | End: 2022-09-26 | Stop reason: HOSPADM

## 2022-09-26 RX ORDER — CELECOXIB 100 MG/1
200 CAPSULE ORAL ONCE
Status: COMPLETED | OUTPATIENT
Start: 2022-09-26 | End: 2022-09-26

## 2022-09-26 RX ORDER — POVIDONE-IODINE 10 MG/ML
SOLUTION TOPICAL ONCE
Status: COMPLETED | OUTPATIENT
Start: 2022-09-26 | End: 2022-09-26

## 2022-09-26 RX ORDER — LABETALOL HYDROCHLORIDE 5 MG/ML
INJECTION, SOLUTION INTRAVENOUS AS NEEDED
Status: DISCONTINUED | OUTPATIENT
Start: 2022-09-26 | End: 2022-09-26 | Stop reason: SURG

## 2022-09-26 RX ORDER — MIDAZOLAM HYDROCHLORIDE 1 MG/ML
2 INJECTION INTRAMUSCULAR; INTRAVENOUS ONCE
Status: COMPLETED | OUTPATIENT
Start: 2022-09-26 | End: 2022-09-26

## 2022-09-26 RX ORDER — PROMETHAZINE HYDROCHLORIDE 25 MG/1
25 SUPPOSITORY RECTAL ONCE AS NEEDED
Status: DISCONTINUED | OUTPATIENT
Start: 2022-09-26 | End: 2022-09-26 | Stop reason: HOSPADM

## 2022-09-26 RX ORDER — ONDANSETRON 2 MG/ML
4 INJECTION INTRAMUSCULAR; INTRAVENOUS EVERY 6 HOURS PRN
Status: DISCONTINUED | OUTPATIENT
Start: 2022-09-26 | End: 2022-09-26 | Stop reason: HOSPADM

## 2022-09-26 RX ORDER — TRANEXAMIC ACID 10 MG/ML
1000 INJECTION, SOLUTION INTRAVENOUS ONCE
Status: COMPLETED | OUTPATIENT
Start: 2022-09-26 | End: 2022-09-26

## 2022-09-26 RX ORDER — SODIUM CHLORIDE, SODIUM LACTATE, POTASSIUM CHLORIDE, CALCIUM CHLORIDE 600; 310; 30; 20 MG/100ML; MG/100ML; MG/100ML; MG/100ML
100 INJECTION, SOLUTION INTRAVENOUS CONTINUOUS
Status: DISCONTINUED | OUTPATIENT
Start: 2022-09-26 | End: 2022-09-26 | Stop reason: HOSPADM

## 2022-09-26 RX ORDER — NALOXONE HCL 0.4 MG/ML
0.4 VIAL (ML) INJECTION
Status: DISCONTINUED | OUTPATIENT
Start: 2022-09-26 | End: 2022-09-26 | Stop reason: HOSPADM

## 2022-09-26 RX ORDER — ACETAMINOPHEN 500 MG
1000 TABLET ORAL EVERY 8 HOURS
Status: DISCONTINUED | OUTPATIENT
Start: 2022-09-26 | End: 2022-09-26 | Stop reason: HOSPADM

## 2022-09-26 RX ORDER — MAGNESIUM HYDROXIDE 1200 MG/15ML
LIQUID ORAL AS NEEDED
Status: DISCONTINUED | OUTPATIENT
Start: 2022-09-26 | End: 2022-09-26 | Stop reason: HOSPADM

## 2022-09-26 RX ORDER — ENOXAPARIN SODIUM 100 MG/ML
40 INJECTION SUBCUTANEOUS DAILY
Status: DISCONTINUED | OUTPATIENT
Start: 2022-09-27 | End: 2022-09-26 | Stop reason: HOSPADM

## 2022-09-26 RX ORDER — ONDANSETRON 4 MG/1
4 TABLET, FILM COATED ORAL EVERY 6 HOURS PRN
Status: DISCONTINUED | OUTPATIENT
Start: 2022-09-26 | End: 2022-09-26 | Stop reason: HOSPADM

## 2022-09-26 RX ORDER — FENTANYL CITRATE 50 UG/ML
INJECTION, SOLUTION INTRAMUSCULAR; INTRAVENOUS AS NEEDED
Status: DISCONTINUED | OUTPATIENT
Start: 2022-09-26 | End: 2022-09-26 | Stop reason: SURG

## 2022-09-26 RX ORDER — AMOXICILLIN 250 MG
2 CAPSULE ORAL 2 TIMES DAILY
Status: DISCONTINUED | OUTPATIENT
Start: 2022-09-26 | End: 2022-09-26 | Stop reason: HOSPADM

## 2022-09-26 RX ORDER — SODIUM CHLORIDE, SODIUM LACTATE, POTASSIUM CHLORIDE, CALCIUM CHLORIDE 600; 310; 30; 20 MG/100ML; MG/100ML; MG/100ML; MG/100ML
9 INJECTION, SOLUTION INTRAVENOUS CONTINUOUS PRN
Status: DISCONTINUED | OUTPATIENT
Start: 2022-09-26 | End: 2022-09-26 | Stop reason: HOSPADM

## 2022-09-26 RX ORDER — HYDROCODONE BITARTRATE AND ACETAMINOPHEN 7.5; 325 MG/1; MG/1
1 TABLET ORAL EVERY 4 HOURS PRN
Status: DISCONTINUED | OUTPATIENT
Start: 2022-09-26 | End: 2022-09-26 | Stop reason: HOSPADM

## 2022-09-26 RX ORDER — ACETAMINOPHEN 500 MG
1000 TABLET ORAL ONCE
Status: DISCONTINUED | OUTPATIENT
Start: 2022-09-26 | End: 2022-09-26

## 2022-09-26 RX ORDER — PROPOFOL 10 MG/ML
VIAL (ML) INTRAVENOUS AS NEEDED
Status: DISCONTINUED | OUTPATIENT
Start: 2022-09-26 | End: 2022-09-26 | Stop reason: SURG

## 2022-09-26 RX ORDER — VANCOMYCIN HYDROCHLORIDE 1 G/20ML
INJECTION, POWDER, LYOPHILIZED, FOR SOLUTION INTRAVENOUS AS NEEDED
Status: DISCONTINUED | OUTPATIENT
Start: 2022-09-26 | End: 2022-09-26 | Stop reason: SURG

## 2022-09-26 RX ORDER — PROMETHAZINE HYDROCHLORIDE 12.5 MG/1
25 TABLET ORAL ONCE AS NEEDED
Status: DISCONTINUED | OUTPATIENT
Start: 2022-09-26 | End: 2022-09-26 | Stop reason: HOSPADM

## 2022-09-26 RX ORDER — LIDOCAINE HYDROCHLORIDE 20 MG/ML
INJECTION, SOLUTION EPIDURAL; INFILTRATION; INTRACAUDAL; PERINEURAL AS NEEDED
Status: DISCONTINUED | OUTPATIENT
Start: 2022-09-26 | End: 2022-09-26 | Stop reason: SURG

## 2022-09-26 RX ORDER — KETOROLAC TROMETHAMINE 15 MG/ML
15 INJECTION, SOLUTION INTRAMUSCULAR; INTRAVENOUS EVERY 6 HOURS SCHEDULED
Status: DISCONTINUED | OUTPATIENT
Start: 2022-09-26 | End: 2022-09-26 | Stop reason: HOSPADM

## 2022-09-26 RX ORDER — HYDROCODONE BITARTRATE AND ACETAMINOPHEN 7.5; 325 MG/1; MG/1
1-2 TABLET ORAL EVERY 4 HOURS PRN
Qty: 40 TABLET | Refills: 0 | Status: SHIPPED | OUTPATIENT
Start: 2022-09-26 | End: 2022-09-30 | Stop reason: SDUPTHER

## 2022-09-26 RX ORDER — PROMETHAZINE HYDROCHLORIDE 12.5 MG/1
12.5 SUPPOSITORY RECTAL EVERY 6 HOURS PRN
Status: DISCONTINUED | OUTPATIENT
Start: 2022-09-26 | End: 2022-09-26 | Stop reason: HOSPADM

## 2022-09-26 RX ORDER — MEPERIDINE HYDROCHLORIDE 25 MG/ML
12.5 INJECTION INTRAMUSCULAR; INTRAVENOUS; SUBCUTANEOUS
Status: DISCONTINUED | OUTPATIENT
Start: 2022-09-26 | End: 2022-09-26 | Stop reason: HOSPADM

## 2022-09-26 RX ADMIN — LABETALOL 20 MG/4 ML (5 MG/ML) INTRAVENOUS SYRINGE 10 MG: at 08:15

## 2022-09-26 RX ADMIN — FENTANYL CITRATE 50 MCG: 50 INJECTION, SOLUTION INTRAMUSCULAR; INTRAVENOUS at 07:14

## 2022-09-26 RX ADMIN — PROPOFOL 150 MG: 10 INJECTION, EMULSION INTRAVENOUS at 07:14

## 2022-09-26 RX ADMIN — ROCURONIUM BROMIDE 10 MG: 10 INJECTION INTRAVENOUS at 07:57

## 2022-09-26 RX ADMIN — SODIUM CHLORIDE, POTASSIUM CHLORIDE, SODIUM LACTATE AND CALCIUM CHLORIDE 9 ML/HR: 600; 310; 30; 20 INJECTION, SOLUTION INTRAVENOUS at 06:43

## 2022-09-26 RX ADMIN — FENTANYL CITRATE 50 MCG: 50 INJECTION, SOLUTION INTRAMUSCULAR; INTRAVENOUS at 08:15

## 2022-09-26 RX ADMIN — MIDAZOLAM HYDROCHLORIDE 2 MG: 1 INJECTION, SOLUTION INTRAMUSCULAR; INTRAVENOUS at 06:56

## 2022-09-26 RX ADMIN — FENTANYL CITRATE 50 MCG: 50 INJECTION, SOLUTION INTRAMUSCULAR; INTRAVENOUS at 07:38

## 2022-09-26 RX ADMIN — KETOROLAC TROMETHAMINE 15 MG: 15 INJECTION, SOLUTION INTRAMUSCULAR; INTRAVENOUS at 11:47

## 2022-09-26 RX ADMIN — POVIDONE-IODINE 4 APPLICATION: 10 SOLUTION TOPICAL at 06:51

## 2022-09-26 RX ADMIN — ONDANSETRON 4 MG: 2 INJECTION INTRAMUSCULAR; INTRAVENOUS at 09:21

## 2022-09-26 RX ADMIN — ONDANSETRON 4 MG: 2 INJECTION INTRAMUSCULAR; INTRAVENOUS at 07:38

## 2022-09-26 RX ADMIN — HYDROMORPHONE HYDROCHLORIDE 0.5 MG: 1 INJECTION, SOLUTION INTRAMUSCULAR; INTRAVENOUS; SUBCUTANEOUS at 09:21

## 2022-09-26 RX ADMIN — ROCURONIUM BROMIDE 10 MG: 10 INJECTION INTRAVENOUS at 07:36

## 2022-09-26 RX ADMIN — TRANEXAMIC ACID 1000 MG: 10 INJECTION, SOLUTION INTRAVENOUS at 06:56

## 2022-09-26 RX ADMIN — FAMOTIDINE 40 MG: 20 TABLET ORAL at 11:47

## 2022-09-26 RX ADMIN — SUGAMMADEX 200 MG: 100 INJECTION, SOLUTION INTRAVENOUS at 08:48

## 2022-09-26 RX ADMIN — SODIUM CHLORIDE, POTASSIUM CHLORIDE, SODIUM LACTATE AND CALCIUM CHLORIDE 100 ML/HR: 600; 310; 30; 20 INJECTION, SOLUTION INTRAVENOUS at 11:48

## 2022-09-26 RX ADMIN — ROCURONIUM BROMIDE 50 MG: 10 INJECTION INTRAVENOUS at 07:14

## 2022-09-26 RX ADMIN — VANCOMYCIN HYDROCHLORIDE 1 G: 1 INJECTION, POWDER, LYOPHILIZED, FOR SOLUTION INTRAVENOUS at 07:08

## 2022-09-26 RX ADMIN — FENTANYL CITRATE 50 MCG: 50 INJECTION, SOLUTION INTRAMUSCULAR; INTRAVENOUS at 07:56

## 2022-09-26 RX ADMIN — TRANEXAMIC ACID 1000 MG: 10 INJECTION, SOLUTION INTRAVENOUS at 07:08

## 2022-09-26 RX ADMIN — VANCOMYCIN HYDROCHLORIDE 1000 MG: 5 INJECTION, POWDER, LYOPHILIZED, FOR SOLUTION INTRAVENOUS at 06:13

## 2022-09-26 RX ADMIN — LIDOCAINE HYDROCHLORIDE 50 MG: 20 INJECTION, SOLUTION EPIDURAL; INFILTRATION; INTRACAUDAL; PERINEURAL at 07:14

## 2022-09-26 RX ADMIN — CELECOXIB 200 MG: 100 CAPSULE ORAL at 06:51

## 2022-09-26 RX ADMIN — ACETAMINOPHEN 1000 MG: 500 TABLET, FILM COATED ORAL at 11:47

## 2022-09-26 RX ADMIN — FERROUS SULFATE TAB 325 MG (65 MG ELEMENTAL FE) 325 MG: 325 (65 FE) TAB at 11:47

## 2022-09-26 RX ADMIN — DEXAMETHASONE SODIUM PHOSPHATE 4 MG: 4 INJECTION, SOLUTION INTRA-ARTICULAR; INTRALESIONAL; INTRAMUSCULAR; INTRAVENOUS; SOFT TISSUE at 07:38

## 2022-09-26 RX ADMIN — ROCURONIUM BROMIDE 10 MG: 10 INJECTION INTRAVENOUS at 08:10

## 2022-09-26 RX ADMIN — LABETALOL 20 MG/4 ML (5 MG/ML) INTRAVENOUS SYRINGE 10 MG: at 08:00

## 2022-09-26 RX ADMIN — TRANEXAMIC ACID 1000 MG: 10 INJECTION, SOLUTION INTRAVENOUS at 08:35

## 2022-09-26 NOTE — PLAN OF CARE
Goal Outcome Evaluation:        Pt stable throughout shift, vitals have been WNL following surgery. Pt S/P right total hip replacement today, dressing changed at bedside by ortho MD, no drainage noted to Aquacel at time of discharge. Pt has been ambulating well with 1x assist and walker without difficulty. Pt voided following surgery. Walker delivered to pt at bedside prior to discharge. Discussed case with Ortho MD, antibiotic sent with home with pt. Pain well controlled.

## 2022-09-26 NOTE — DISCHARGE INSTRUCTIONS
Leave current dressing (Aquacel) in place until  9/29 , remove and clean with alcohol swabs and replace Aqaucel. Leave in place additional 3 days until 10/2 . Then begin daily dry dressing changes as instructed at discharge until follow up with Ortho MD.     Please see handout provided at discharge for additional instructions.       Additionally, take all medications as prescribed. Complete Eliquis completely before beginning Aspirin.

## 2022-09-26 NOTE — PLAN OF CARE
Goal Outcome Evaluation:  Plan of Care Reviewed With: patient, spouse        Progress: no change  Outcome Evaluation: Patient presents with limitations in self-care, functional transfers, and balance. She would benefit from continued skilled occupational therapy services to maximize independence with ADLs/functional transfers. Home heatlh therapy services recommended upon discharge from hospital.

## 2022-09-26 NOTE — ANESTHESIA POSTPROCEDURE EVALUATION
Patient: Rose Marie Galdamez    Procedure Summary     Date: 09/26/22 Room / Location: [facility] OR 03 / [facility] MAIN OR    Anesthesia Start: 0708 Anesthesia Stop: 0910    Procedure: RIGHT TOTAL HIP ARTHROPLASTY ANTERIOR (Right Hip) Diagnosis:       Primary osteoarthritis of right hip      (Primary osteoarthritis of right hip [M16.11])    Surgeons: Robby Roche MD Provider: Lavell Johnson MD    Anesthesia Type: general ASA Status: 2          Anesthesia Type: general    Vitals  Vitals Value Taken Time   /66 09/26/22 0943   Temp 36.4 °C (97.5 °F) 09/26/22 0946   Pulse 87 09/26/22 0946   Resp 16 09/26/22 0946   SpO2 98 % 09/26/22 0946   Vitals shown include unvalidated device data.        Post Anesthesia Care and Evaluation    Patient location during evaluation: bedside  Patient participation: complete - patient participated  Level of consciousness: awake  Pain management: adequate    Airway patency: patent  Anesthetic complications: No anesthetic complications  PONV Status: none  Cardiovascular status: acceptable  Respiratory status: acceptable  Hydration status: acceptable    Comments: An Anesthesiologist personally participated in the most demanding procedures (including induction and emergence if applicable) in the anesthesia plan, monitored the course of anesthesia administration at frequent intervals and remained physically present and available for immediate diagnosis and treatment of emergencies.

## 2022-09-26 NOTE — OP NOTE
TOTAL HIP ARTHROPLASTY ANTERIOR  Procedure Report    Patient Name:  Rose Marie Galdamez  YOB: 1950    Date of Surgery:  9/26/2022     Indications:  Advanced hip arthritis, failed conservative care    Pre-op Diagnosis:   Primary osteoarthritis of right hip [M16.11]       Post-Op Diagnosis Codes:     * Primary osteoarthritis of right hip [M16.11]    Procedure/CPT® Codes:      Procedure(s):  RIGHT TOTAL HIP ARTHROPLASTY ANTERIOR    Staff:  Surgeon(s):  Robby Roche MD    Assistant: Dora Hill RN; Elza Qiu    Surgical Approach: Hip Direct Anterior ([place])        Anesthesia: General    Estimated Blood Loss: 300 mL    Implants:    Implant Name Type Inv. Item Serial No.  Lot No. LRB No. Used Action   SCRW ACET LACEY TRILOGY S/TAP 6.5X25 - [id number] Implant SCRW ACET LACEY TRILOGY S/TAP 6.5X25  [employer] [id number] Right 1 Implanted   LINER ACET G7 VIVACIT/E NTRL HXPE SZB 32MM - [id number] Implant LINER ACET G7 VIVACIT/E NTRL HXPE SZB 32MM  [employer] [id number] Right 1 Implanted   SHLL ACET G7 PPS LTD/HL TI SZB 46MM - [id number] Implant SHLL ACET G7 PPS LTD/HL TI SZB 46MM  [employer] [id number] Right 1 Implanted   HD FEM/HIP G7 BIOLOX/DELTA OPTN 32MM - [id number] Implant HD FEM/HIP G7 BIOLOX/DELTA OPTN 32MM  [employer] [id number] Right 1 Implanted   STEM FEM/HIP MICROPLASTY TAPERLOC COMPL STD SZ9 - [id number] Implant STEM FEM/HIP MICROPLASTY TAPERLOC COMPL STD SZ9  [employer] [id number] Right 1 Implanted   ADAPT HIP BIOLOX OPTN TYPE1 TPR MIN 6 - [id number] Implant ADAPT HIP BIOLOX OPTN TYPE1 TPR MIN 6  [employer] [id number] Right 1 Implanted       Specimen:          None        Findings: Advanced arthritis    Complications:  None    Description of Procedure: The patient went to the operating room and  underwent anesthesia, received a preoperative antibiotic, was placed on the [place] table and was then prepped and draped in standard fashion. A standard anterior hip  incision was made. The interval was found and retractors were placed. The capsule was then opened. The femoral neck was identified. Retractors were placed around the neck. The femoral neck cut was then made and then the head was removed from the acetabulum. Acetabular retractors were then placed. The labrum was removed. C-arm fluoroscopy was used to help guide the reaming for the acetabulum. This was sequentially reamed and then the appropriate size shell was then inserted, followed by a screw and then the  liner. The bed was raised, the leg was dropped, and femoral exposure was obtained. This was then opened using a rat-tail reamer and then sequentially broached  and then a stem was inserted. Leg lengths were measured after reduction and a ceramic head was then chosen. This was then thoroughly irrigated, tranexamic acid and local were injected, and then closed using #1 Vicryl, 2-0 Vicryl and staples. Sterile dressing was applied. The patient was then taken to recovery in stable condition. There were no complications.    Assistant: Dora Hill RN; Elza Qiu  was responsible for performing the following activities: Retraction, Suction, Irrigation, Closing and Placing Dressing and their skilled assistance was necessary for the success of this case.    Robby Roche MD     Date: 9/26/2022  Time: 08:50 EDT

## 2022-09-26 NOTE — ANESTHESIA PREPROCEDURE EVALUATION
Anesthesia Evaluation     Patient summary reviewed and Nursing notes reviewed   no history of anesthetic complications:  NPO Solid Status: > 8 hours  NPO Liquid Status: > 2 hours           Airway   Mallampati: II  TM distance: >3 FB  Neck ROM: full  No difficulty expected  Dental      Pulmonary - normal exam    breath sounds clear to auscultation  (+) asthma,  Cardiovascular - normal exam  Exercise tolerance: good (4-7 METS)    Rhythm: regular  Rate: normal    (+) hypertension,       Neuro/Psych  (+) psychiatric history,    GI/Hepatic/Renal/Endo - negative ROS     Musculoskeletal     Abdominal    Substance History - negative use     OB/GYN negative ob/gyn ROS         Other   arthritis,      ROS/Med Hx Other:  <4 METS. DECREASED MOBILITY, PAIN. HX HTN, ASTHMA. HX NEUROPATHY. LAST PCP OV 9/15/22. [facility]      Phys Exam Other: CHIPPED UPPER RIGHT LATERAL INCISOR              Anesthesia Plan    ASA 2     general     Reason for not using neuraxial anesthesia or peripheral nerve block: Patient Preference  (Patient understands anesthesia not responsible for dental damage.  Regional anesthesia options discussed; patient refuses regional block.  Pt wants general anesthesia    Pt going to take albuterol, celebrex, ativan this am)  intravenous induction     Anesthetic plan, risks, benefits, and alternatives have been provided, discussed and informed consent has been obtained with: patient.    Use of blood products discussed with patient .   Plan discussed with CRNA.        CODE STATUS:

## 2022-09-26 NOTE — SIGNIFICANT NOTE
09/26/22 1158   Plan   Final Discharge Disposition Code 06 - home with home health care   Final Note Home with [facility]

## 2022-09-26 NOTE — PLAN OF CARE
Goal Outcome Evaluation:  Plan of Care Reviewed With: patient           Outcome Evaluation: Patient demonstrated safety with all mobility tasks and is safe to return home with the assistance of her .  She will be discharged from inpatient physical therapy services at this time as she is being discharged from the hospital.  Recommend follow-up with outpatient physical therapy services to address her decreased strength and range of motion postoperatively

## 2022-09-26 NOTE — THERAPY EVALUATION
Acute Care - Physical Therapy Initial Evaluation   Jaime     Patient Name: Rose Marie Galdamez  : 1950  MRN: [id number]  Today's Date: 2022     Admit date: 2022     Referring Physician: Robby Roche MD     Surgery Date:2022   Procedure(s) (LRB):  RIGHT TOTAL HIP ARTHROPLASTY ANTERIOR (Right)            Visit Dx:     ICD-10-CM ICD-9-CM   1. Decreased activities of daily living (ADL)  Z78.9 V49.89   2. Primary osteoarthritis of right hip  M16.11 715.15   3. Difficulty in walking  R26.2 719.7     Patient Active Problem List   Diagnosis   • OA (osteoarthritis) of hip     Past Medical History:   Diagnosis Date   • Anxiety    • Arthritis    • Asthma    • Depression    • Hemorrhoid    • HTN (hypertension)    • Neuropathy     left leg, from back pain   • Right hip pain      Past Surgical History:   Procedure Laterality Date   • CARPAL TUNNEL RELEASE Bilateral    • CATARACT EXTRACTION Bilateral    • CHOLECYSTECTOMY     • COLONOSCOPY     • ESOPHAGUS SURGERY     • HYSTERECTOMY     • LAPAROSCOPIC TUBAL LIGATION       PT Assessment (last 12 hours)     PT Evaluation and Treatment     Row Name 22 1500          Physical Therapy Time and Intention    Subjective Information no complaints  -GAMALIEL     Document Type evaluation;therapy note (daily note)  -GAMALIEL     Mode of Treatment individual therapy;physical therapy  -GAMALIEL     Patient Effort good  -GAMALIEL     Row Name 22 1500          General Information    Patient Observations alert;cooperative;agree to therapy  -GAMALIEL     Prior Level of Function independent:;all household mobility;community mobility  -GAMALIEL     Equipment Currently Used at Home cane, straight;rollator  -GAMALIEL     Existing Precautions/Restrictions fall;weight bearing  -GAMALIEL     Barriers to Rehab none identified  -GAMALIEL     Row Name 22 1500          Living Environment    Current Living Arrangements home  -GAMALIEL     People in Home spouse  -GAMALIEL     Row Name 22 1500          Range of Motion (ROM)     Range of Motion ROM is WFL  -GAMALIEL     Row Name 09/26/22 1500          Strength (Manual Muscle Testing)    Strength (Manual Muscle Testing) right lower extremity  4-/5  -GAMALIEL     Row Name 09/26/22 1500          Mobility    Extremity Weight-bearing Status right lower extremity  -GAMALIEL     Right Lower Extremity (Weight-bearing Status) weight-bearing as tolerated (WBAT)  -GAMALIEL     Row Name 09/26/22 1500          Bed Mobility    Bed Mobility bed mobility (all) activities;supine-sit  -GAMALIEL     All Activities, [place] (Bed Mobility) contact guard  -GAMALIEL     Supine-Sit [place] (Bed Mobility) minimum assist (75% patient effort)  -GAMALIEL     Row Name 09/26/22 1500          Transfers    Transfers bed-chair transfer;sit-stand transfer  -GAMALIEL     Bed-Chair [place] (Transfers) contact guard  -GAMALIEL     Assistive Device (Bed-Chair Transfers) walker, front-wheeled  -GAMALIEL     Sit-Stand [place] (Transfers) contact guard  -GAMALIEL     Row Name 09/26/22 1500          Sit-Stand Transfer    Assistive Device (Sit-Stand Transfers) walker, front-wheeled  -GAMALIEL     Row Name 09/26/22 1500          Gait/Stairs (Locomotion)    Gait/Stairs Locomotion gait/ambulation assistive device  -GAMALIEL     [place] Level (Gait) contact guard  -GAMALIEL     Assistive Device (Gait) walker, front-wheeled  -GAMALIEL     Ambulated day of surgery or within 4 hours of PACU discharge yes  -GAMALIEL     Distance in Feet (Gait) 165  -GAMALIEL     Negotiation (Stairs) stairs independence  -GAMALIEL     [place] Level (Stairs) contact guard  -GAMALIEL     Handrail Location (Stairs) both sides  -GAMALIEL     Number of Steps (Stairs) 4  -GAMALIEL     Ascending Technique (Stairs) step-to-step  -GAMALIEL     Row Name 09/26/22 1500          Safety Issues, Functional Mobility    Impairments Affecting Function (Mobility) balance;pain;strength  -GAMALIEL     Row Name 09/26/22 1500          Balance    Dynamic Standing Balance contact guard  -GAMALIEL     Position/Device Used, Standing Balance walker, front-wheeled  -GAMALIEL     Row Name              Wound 09/26/22 0802 Right anterior hip Incision    Wound - Properties Group Placement Date: 09/26/22  -SC Placement Time: 0802  -SC Present on Hospital Admission: N  -SC Side: Right  -SC Orientation: anterior  -SC Location: hip  -SC Primary Wound Type: Incision  -SC     Retired Wound - Properties Group Placement Date: 09/26/22  -SC Placement Time: 0802  -SC Present on Hospital Admission: N  -SC Side: Right  -SC Orientation: anterior  -SC Location: hip  -SC Primary Wound Type: Incision  -SC     Retired Wound - Properties Group Date first assessed: 09/26/22  -SC Time first assessed: 0802  -SC Present on Hospital Admission: N  -SC Side: Right  -SC Location: hip  -SC Primary Wound Type: Incision  -SC     Row Name 09/26/22 1500          Plan of Care Review    Plan of Care Reviewed With patient  -GAMALIEL     Outcome Evaluation Patient demonstrated safety with all mobility tasks and is safe to return home with the assistance of her .  She will be discharged from inpatient physical therapy services at this time as she is being discharged from the hospital.  Recommend follow-up with outpatient physical therapy services to address her decreased strength and range of motion postoperatively  -GAMALIEL     Row Name 09/26/22 1500          Therapy Assessment/Plan (PT)    Patient/Family Therapy Goals Statement (PT) Patient wants to have less pain  -GAMALIEL     Criteria for Skilled Interventions Met (PT) skilled treatment is necessary  -GAMALIEL     Therapy Frequency (PT) evaluation only  as pt is d/c from the hospital today  -GAMALIEL     Row Name 09/26/22 1500          PT Evaluation Complexity    History, PT Evaluation Complexity no personal factors and/or comorbidities  -GAMALIEL     Examination of Body Systems (PT Eval Complexity) total of 4 or more elements  -GAMALIEL     Clinical Presentation (PT Evaluation Complexity) stable  -GAMALIEL     Clinical Decision Making (PT Evaluation Complexity) low complexity  -GAMALIEL     Overall Complexity (PT Evaluation Complexity) low  complexity  -GAMALIEL     Row Name 09/26/22 1500          Therapy Plan Review/Discharge Plan (PT)    Therapy Plan Review (PT) evaluation/treatment results reviewed;participants voiced agreement with care plan;participants included;patient  -GAMALIEL           User Key  (r) = Recorded By, (t) = Taken By, (c) = Cosigned By    Initials Name Provider Type    Ying Oropeza, RN Registered Nurse    Viral Ramos PT Physical Therapist                Physical Therapy Education                 Title: PT OT SLP Therapies (Done)     Topic: Physical Therapy (Done)     Point: Mobility training (Done)     Learning Progress Summary           Patient Acceptance, E,TB, VU by GAMALIEL at 9/26/2022 1527                   Point: Precautions (Done)     Learning Progress Summary           Patient Acceptance, E,TB, VU by GAMALIEL at 9/26/2022 1527                               User Key     Initials Effective Dates Name Provider Type Discipline    GAMALIEL 06/03/21 -  Viral Fierro PT Physical Therapist PT              PT Recommendation and Plan  Anticipated Discharge Disposition (PT): home with outpatient therapy services  Therapy Frequency (PT): evaluation only (as pt is d/c from the hospital today)  Plan of Care Reviewed With: patient  Outcome Evaluation: Patient demonstrated safety with all mobility tasks and is safe to return home with the assistance of her .  She will be discharged from inpatient physical therapy services at this time as she is being discharged from the hospital.  Recommend follow-up with outpatient physical therapy services to address her decreased strength and range of motion postoperatively   Outcome Measures     Row Name 09/26/22 1500             How much help from another person do you currently need...    Turning from your back to your side while in flat bed without using bedrails? 3  -GAMALIEL      Moving from lying on back to sitting on the side of a flat bed without bedrails? 3  -GAMALIEL      Moving to and from a bed to a  chair (including a wheelchair)? 3  -GAMALIEL      Standing up from a chair using your arms (e.g., wheelchair, bedside chair)? 3  -GAMALIEL      Climbing 3-5 steps with a railing? 3  -GAMALIEL      To walk in hospital room? 3  -GAMALIEL      AM-PAC 6 Clicks Score (PT) 18  -GAMALIEL              Functional Assessment    Outcome Measure Options AM-PAC 6 Clicks Basic Mobility (PT)  -GAMALIEL            User Key  (r) = Recorded By, (t) = Taken By, (c) = Cosigned By    Initials Name Provider Type    Viral Ramos PT Physical Therapist                 Time Calculation:    PT Charges     Row Name 09/26/22 1519             Time Calculation    PT Received On 09/26/22  -GAMALIEL      PT Goal Re-Cert Due Date 10/05/22  -GAMALIEL              Timed Charges    14449 - Gait Training Minutes  10  -GAMALIEL      72387 - PT Therapeutic Activity Minutes 8  -GAMALIEL              Untimed Charges    PT Eval/Re-eval Minutes 30  -GAMALIEL              Total Minutes    Timed Charges Total Minutes 18  -GAMALIEL      Untimed Charges Total Minutes 30  -GAMALIEL       Total Minutes 48  -GAMALIEL            User Key  (r) = Recorded By, (t) = Taken By, (c) = Cosigned By    Initials Name Provider Type    Viral Ramos PT Physical Therapist              Therapy Charges for Today     Code Description Service Date Service Provider Modifiers Qty    59696940133 HC GAIT TRAINING EA 15 MIN 9/26/2022 Viral Fierro, PT GP 1    89039204738 HC PT EVAL LOW COMPLEXITY 3 9/26/2022 Viral Fierro, PT GP 1          PT G-Codes  Outcome Measure Options: AM-PAC 6 Clicks Basic Mobility (PT)  AM-PAC 6 Clicks Score (PT): 18  AM-PAC 6 Clicks Score (OT): 21    Viral Fierro PT  9/26/2022

## 2022-09-26 NOTE — THERAPY EVALUATION
Patient Name: Rose Marie Galdamez  : 1950    MRN: [id number]                              Today's Date: 2022       Admit Date: 2022    Visit Dx:     ICD-10-CM ICD-9-CM   1. Decreased activities of daily living (ADL)  Z78.9 V49.89   2. Primary osteoarthritis of right hip  M16.11 715.15     Patient Active Problem List   Diagnosis   • OA (osteoarthritis) of hip     Past Medical History:   Diagnosis Date   • Anxiety    • Arthritis    • Asthma    • Depression    • Hemorrhoid    • HTN (hypertension)    • Neuropathy     left leg, from back pain   • Right hip pain      Past Surgical History:   Procedure Laterality Date   • CARPAL TUNNEL RELEASE Bilateral    • CATARACT EXTRACTION Bilateral    • CHOLECYSTECTOMY     • COLONOSCOPY     • ESOPHAGUS SURGERY     • HYSTERECTOMY     • LAPAROSCOPIC TUBAL LIGATION        General Information     Row Name 22 1200 22 1154       OT Time and Intention    Document Type therapy note (daily note)  - evaluation  -    Mode of Treatment individual therapy;occupational therapy  - individual therapy;occupational therapy  -    Row Name 22 1150          General Information    Patient Profile Reviewed yes  -     Prior Level of Function --  (I) with ADLs, ambulated with a STC, has a walk-in shower with shower chair, standard commode and BSC, stands to groom, and no home O2.  -     Existing Precautions/Restrictions weight bearing  WBAT RLE  -     Barriers to Rehab none identified  -     Row Name 22 1154          Occupational Profile    Reason for Services/Referral (Occupational Profile) Patient is a 72 year old female who is currently status post right total hip replacement with anterior approach on 2022. Occupational therapy consulted due to recent decline in ADLs/functional transfers. No previous occupational therapy services for current condition.  -     Row Name 22 1154          Living Environment    People in Home  spouse  -     Row Name 09/26/22 1154          Home Main Entrance    Number of Stairs, Main Entrance one  -     Row Name 09/26/22 1154          Cognition    Orientation Status (Cognition) oriented x 4  -     Row Name 09/26/22 1154          Safety Issues, Functional Mobility    Impairments Affecting Function (Mobility) balance  -           User Key  (r) = Recorded By, (t) = Taken By, (c) = Cosigned By    Initials Name Provider Type     Lamar Liu OT Occupational Therapist                 Mobility/ADL's     Row Name 09/26/22 1200 09/26/22 1156       Bed Mobility    Comment, (Bed Mobility) Patient upright and seated in recliner upon therapist arrival.  -LF Patient upright and seated in recliner upon therapist arrival.  -    Row Name 09/26/22 1200 09/26/22 1156       Transfers    Transfers sit-stand transfer;stand-sit transfer  -LF sit-stand transfer;stand-sit transfer  -LF    Sit-Stand [place] (Transfers) contact guard  -LF contact guard  -LF    Stand-Sit [place] (Transfers) contact guard  -LF contact guard  -    Row Name 09/26/22 1200 09/26/22 1156       Sit-Stand Transfer    Assistive Device (Sit-Stand Transfers) walker, front-wheeled  -LF walker, front-wheeled  -LF    Row Name 09/26/22 1200 09/26/22 1156       Stand-Sit Transfer    Assistive Device (Stand-Sit Transfers) walker, front-wheeled  -LF walker, front-wheeled  -LF    Row Name 09/26/22 1200 09/26/22 1156       Activities of Daily Living    BADL Assessment/Intervention upper body dressing;lower body dressing  -LF bathing;upper body dressing;lower body dressing;grooming;feeding;toileting  -    Row Name 09/26/22 1200 09/26/22 1156       Mobility    Extremity Weight-bearing Status right lower extremity  -LF right lower extremity  -LF    Right Lower Extremity (Weight-bearing Status) weight-bearing as tolerated (WBAT)  -LF weight-bearing as tolerated (WBAT)  -    Row Name 09/26/22 1156          Bathing Assessment/Intervention     [place] Level (Bathing) bathing skills;upper body;set up;lower body;minimum assist (75% patient effort);contact guard assist  -     Row Name 09/26/22 1200 09/26/22 1156       Upper Body Dressing Assessment/Training    [place] Level (Upper Body Dressing) upper body dressing skills;don;bra/undergarment;pull-over garment;set up  - upper body dressing skills;set up  -LF    Position (Upper Body Dressing) unsupported sitting  -LF --    Row Name 09/26/22 1200 09/26/22 1156       Lower Body Dressing Assessment/Training    [place] Level (Lower Body Dressing) lower body dressing skills;don;pants/bottoms;shoes/slippers;doff;socks;contact guard assist;minimum assist (75% patient effort)  -LF lower body dressing skills;contact guard assist;minimum assist (75% patient effort)  -LF    Position (Lower Body Dressing) unsupported sitting;supported standing  -LF --    Comment, (Lower Body Dressing) Educated patient on lower body adaptive dressing technique, verified learning via teachback.  - --    Row Name 09/26/22 1156          Grooming Assessment/Training    [place] Level (Grooming) grooming skills;set up  -[facility] Name 09/26/22 1156          Self-Feeding Assessment/Training    [place] Level (Feeding) feeding skills;set up  -[facility] Name 09/26/22 1156          Toileting Assessment/Training    [place] Level (Toileting) toileting skills;contact guard assist  -           User Key  (r) = Recorded By, (t) = Taken By, (c) = Cosigned By    Initials Name Provider Type     Lamar Liu OT Occupational Therapist               Obj/Interventions     Row Name 09/26/22 1157          Sensory Assessment (Somatosensory)    Sensory Assessment (Somatosensory) UE sensation intact  -[facility] Name 09/26/22 1157          Vision Assessment/Intervention    Visual Impairment/Limitations WFL;corrective lenses for reading  -[facility] Name 09/26/22 1157          Range of Motion Comprehensive    General  Range of Motion bilateral upper extremity ROM WFL  -LF     Row Name 09/26/22 1157          Strength Comprehensive (MMT)    Comment, General Manual Muscle Testing (MMT) Assessment 5/5 BUEs  -LF     Row Name 09/26/22 1201 09/26/22 1157       Motor Skills    Motor Skills functional endurance  -LF coordination;functional endurance  -LF    Coordination -- WFL  -LF    Functional Endurance Good for BADLs  -LF Good for BADLs  -LF    Row Name 09/26/22 1201 09/26/22 1157       Balance    Balance Assessment sitting dynamic balance;standing dynamic balance  -LF sitting dynamic balance;standing dynamic balance  -LF    Dynamic Sitting Balance independent  -LF independent  -LF    Position, Sitting Balance unsupported;sitting in chair  -LF unsupported;sitting in chair  -LF    Dynamic Standing Balance contact guard  -LF contact guard  -LF    Position/Device Used, Standing Balance supported;walker, front-wheeled  -LF supported;walker, front-wheeled  -LF    Balance Interventions sitting;standing;sit to stand;supported;dynamic;occupation based/functional task;weight shifting activity  -LF --          User Key  (r) = Recorded By, (t) = Taken By, (c) = Cosigned By    Initials Name Provider Type    LF Lamar Liu OT Occupational Therapist               Goals/Plan     Row Name 09/26/22 1159          Bed Mobility Goal 1 (OT)    Activity/Assistive Device (Bed Mobility Goal 1, OT) bed mobility activities, all  -LF     [place] Level/Cues Needed (Bed Mobility Goal 1, OT) modified independence  -LF     Time Frame (Bed Mobility Goal 1, OT) long term goal (LTG);10 days  -     Row Name 09/26/22 1159          Transfer Goal 1 (OT)    Activity/Assistive Device (Transfer Goal 1, OT) transfers, all;walker, rolling  -LF     [place] Level/Cues Needed (Transfer Goal 1, OT) modified independence  -LF     Time Frame (Transfer Goal 1, OT) long term goal (LTG);10 days  -     Row Name 09/26/22 1159          Bathing Goal 1 (OT)     Activity/Device (Bathing Goal 1, OT) bathing skills, all;lower body bathing  -LF     [place] Level/Cues Needed (Bathing Goal 1, OT) modified independence  -LF     Time Frame (Bathing Goal 1, OT) long term goal (LTG);10 days  -LF     Row Name 09/26/22 1159          Dressing Goal 1 (OT)    Activity/Device (Dressing Goal 1, OT) dressing skills, all;lower body dressing  -LF     [place]/Cues Needed (Dressing Goal 1, OT) modified independence  -LF     Time Frame (Dressing Goal 1, OT) long term goal (LTG);10 days  -LF     Row Name 09/26/22 1159          Toileting Goal 1 (OT)    Activity/Device (Toileting Goal 1, OT) toileting skills, all  -LF     [place] Level/Cues Needed (Toileting Goal 1, OT) modified independence  -LF     Time Frame (Toileting Goal 1, OT) long term goal (LTG);10 days  -LF     Row Name 09/26/22 1159          Therapy Assessment/Plan (OT)    Planned Therapy Interventions (OT) activity tolerance training;patient/caregiver education/training;BADL retraining;functional balance retraining;occupation/activity based interventions;transfer/mobility retraining  -LF           User Key  (r) = Recorded By, (t) = Taken By, (c) = Cosigned By    Initials Name Provider Type     Lamar Liu, OT Occupational Therapist               Clinical Impression     Row Name 09/26/22 1156          Pain Assessment    Additional Documentation Pain Scale: FACES Pre/Post-Treatment (Group)  -     Row Name 09/26/22 5914          Pain Scale: FACES Pre/Post-Treatment    Pain: FACES Scale, Pretreatment 2-->hurts little bit  -LF     Posttreatment Pain Rating 2-->hurts little bit  -LF     Pain Location - Side/Orientation Right  -LF     Pain Location - hip  -LF     Row Name 09/26/22 7288          Plan of Care Review    Plan of Care Reviewed With patient;spouse  -LF     Progress no change  -LF     Outcome Evaluation Patient presents with limitations in self-care, functional transfers, and balance. She would benefit from  continued skilled occupational therapy services to maximize independence with ADLs/functional transfers. Home heat therapy services recommended upon discharge from hospital.  -     Row Name 09/26/22 1158          Therapy Assessment/Plan (OT)    Patient/Family Therapy Goal Statement (OT) To walk with less pain.  -     Rehab Potential (OT) good, to achieve stated therapy goals  -     Criteria for Skilled Therapeutic Interventions Met (OT) yes;meets criteria;skilled treatment is necessary  -     Therapy Frequency (OT) 5 times/wk  -     Row Name 09/26/22 1158          Therapy Plan Review/Discharge Plan (OT)    Equipment Needs Upon Discharge (OT) walker, rolling  -     Anticipated Discharge Disposition (OT) home with home health  -     Row Name 09/26/22 1158          Vital Signs    O2 Delivery Pre Treatment nasal cannula  -LF     O2 Delivery Intra Treatment nasal cannula  -LF     O2 Delivery Post Treatment nasal cannula  -LF           User Key  (r) = Recorded By, (t) = Taken By, (c) = Cosigned By    Initials Name Provider Type     Lamar Liu, OT Occupational Therapist               Outcome Measures     Row Name 09/26/22 1159          How much help from another is currently needed...    Putting on and taking off regular lower body clothing? 3  -LF     Bathing (including washing, rinsing, and drying) 3  -LF     Toileting (which includes using toilet bed pan or urinal) 3  -LF     Putting on and taking off regular upper body clothing 4  -LF     Taking care of personal grooming (such as brushing teeth) 4  -LF     Eating meals 4  -LF     AM-PAC 6 Clicks Score (OT) 21  -LF     Row Name 09/26/22 1045 09/26/22 1014       How much help from another person do you currently need...    Turning from your back to your side while in flat bed without using bedrails? 3  -CHAIM 3  -CHAIM    Moving from lying on back to sitting on the side of a flat bed without bedrails? 3  -CHAIM 3  -CHAIM    Moving to and from a bed to a chair  (including a wheelchair)? 3  -CHAIM 3  -CHAIM    Standing up from a chair using your arms (e.g., wheelchair, bedside chair)? 3  -CHAIM 3  -CHAIM    Climbing 3-5 steps with a railing? 3  -CHAIM 3  -CHAIM    To walk in hospital room? 3  -CHAIM 3  -CHAIM    AM-PAC 6 Clicks Score (PT) 18  -CHAIM 18  -CHAIM    Highest level of mobility 6 --> Walked 10 steps or more  -CHAIM 6 --> Walked 10 steps or more  -CHAIM    Row Name 09/26/22 1159          Functional Assessment    Outcome Measure Options AM-PAC 6 Clicks Daily Activity (OT);Optimal Instrument  -LF     Row Name 09/26/22 1159          Optimal Instrument    Optimal Instrument Optimal - 3  -LF     Bending/Stooping 2  -LF     Standing 2  -LF     Reaching 1  -LF     From the list, choose the 3 activities you would most like to be able to do without any difficulty Bending/stooping;Standing;Reaching  -LF     Total Score Optimal - 3 5  -LF           User Key  (r) = Recorded By, (t) = Taken By, (c) = Cosigned By    Initials Name Provider Type    Kelin Vaughan, RN Registered Nurse    Lamar Schumacher, OT Occupational Therapist                Occupational Therapy Education                 Title: PT OT SLP Therapies (Done)     Topic: Occupational Therapy (Done)     Point: ADL training (Done)     Description:   Instruct learner(s) on proper safety adaptation and remediation techniques during self care or transfers.   Instruct in proper use of assistive devices.              Learning Progress Summary           Patient Acceptance, E,TB, VU by  at 9/26/2022 1159                   Point: Precautions (Done)     Description:   Instruct learner(s) on prescribed precautions during self-care and functional transfers.              Learning Progress Summary           Patient Acceptance, E,TB, VU by  at 9/26/2022 1159                   Point: Body mechanics (Done)     Description:   Instruct learner(s) on proper positioning and spine alignment during self-care, functional mobility activities and/or exercises.               Learning Progress Summary           Patient Acceptance, E,TB, VU by  at 9/26/2022 1159                               User Key     Initials Effective Dates Name Provider Type Discipline     06/16/21 -  Lamar Liu OT Occupational Therapist OT              OT Recommendation and Plan  Planned Therapy Interventions (OT): activity tolerance training, patient/caregiver education/training, BADL retraining, functional balance retraining, occupation/activity based interventions, transfer/mobility retraining  Therapy Frequency (OT): 5 times/wk  Plan of Care Review  Plan of Care Reviewed With: patient, spouse  Progress: no change  Outcome Evaluation: Patient presents with limitations in self-care, functional transfers, and balance. She would benefit from continued skilled occupational therapy services to maximize independence with ADLs/functional transfers. Home heatlh therapy services recommended upon discharge from hospital.     Time Calculation:    Time Calculation- OT     Row Name 09/26/22 1200             Time Calculation- OT    OT Received On 09/26/22  -LF      OT Goal Re-Cert Due Date 10/05/22  -LF              Timed Charges    96081 - OT Therapeutic Activity Minutes 10  -LF      07485 - OT Self Care/Mgmt Minutes 20  -LF              Untimed Charges    OT Eval/Re-eval Minutes 25  -LF              Total Minutes    Timed Charges Total Minutes 30  -LF      Untimed Charges Total Minutes 25  -LF       Total Minutes 55  -LF            User Key  (r) = Recorded By, (t) = Taken By, (c) = Cosigned By    Initials Name Provider Type     Lamar Liu OT Occupational Therapist              Therapy Charges for Today     Code Description Service Date Service Provider Modifiers Qty    06207172588 HC OT THERAPEUTIC ACT EA 15 MIN 9/26/2022 Lamar Liu OT GO 1    19327708151 HC OT SELF CARE/MGMT/TRAIN EA 15 MIN 9/26/2022 Lamar Liu OT GO 1    86242152417 HC OT EVAL LOW COMPLEXITY 2 9/26/2022 Alexa  Lamar, OT GO 1               Lamar Liu, FIDELIA  9/26/2022

## 2022-09-30 DIAGNOSIS — Z78.9 DECREASED ACTIVITIES OF DAILY LIVING (ADL): ICD-10-CM

## 2022-09-30 RX ORDER — HYDROCODONE BITARTRATE AND ACETAMINOPHEN 7.5; 325 MG/1; MG/1
1-2 TABLET ORAL EVERY 4 HOURS PRN
Qty: 40 TABLET | Refills: 0 | Status: SHIPPED | OUTPATIENT
Start: 2022-09-30

## 2022-09-30 NOTE — TELEPHONE ENCOUNTER
Patient called requesting a refill on pain medication. Pharmacy [facility].

## 2022-10-04 ENCOUNTER — TELEPHONE (OUTPATIENT)
Dept: ORTHOPEDIC SURGERY | Facility: CLINIC | Age: 72
End: 2022-10-04

## 2022-10-04 NOTE — TELEPHONE ENCOUNTER
RECEIVED A MESSAGE FROM [facility] ON MY PHONE ABOUT PATIENT'S PULSE RATE BEING  WHICH IS OUT OF RANGE FOR NORMAL FOR [facility].  PATIENT IS NOT HAVING SYMPTOMS.  I CALLED AND SPOKE WITH PATIENT AND SHE STATES SHE IS DOING FINE AND FEELING BETTER. SHE WAS ADVISED TO CALL DR PAZ IF SHE HAS ISSUES AND IF PULSE CONTINUES TO BE ELEVATED.

## 2022-10-11 ENCOUNTER — OFFICE VISIT (OUTPATIENT)
Dept: ORTHOPEDIC SURGERY | Facility: CLINIC | Age: 72
End: 2022-10-11

## 2022-10-11 VITALS — WEIGHT: 144 LBS | OXYGEN SATURATION: 96 % | BODY MASS INDEX: 33.33 KG/M2 | HEIGHT: 55 IN | HEART RATE: 62 BPM

## 2022-10-11 DIAGNOSIS — Z96.641 AFTERCARE FOLLOWING RIGHT HIP JOINT REPLACEMENT SURGERY: Primary | ICD-10-CM

## 2022-10-11 DIAGNOSIS — Z47.1 AFTERCARE FOLLOWING RIGHT HIP JOINT REPLACEMENT SURGERY: Primary | ICD-10-CM

## 2022-10-11 PROCEDURE — 99024 POSTOP FOLLOW-UP VISIT: CPT | Performed by: ORTHOPAEDIC SURGERY

## 2022-10-11 NOTE — PROGRESS NOTES
"Chief Complaint  Follow-up and Pain of the Right Hip     Subjective      Rose Marie Galdamez presents to [facility] for a follow-up of right hip. Patient is s/p right total hip arthroplasty performed on 9/26/22. She reports attending therapy, this appears to be going well. Patient denies any groin pain today. She states that her incision has been doing well.     Patient has concern for her hands today. She has difficulty with gripping with her hands. She noticed this post-operatively. Anesthesia should not affect this or any procedure performed.     Allergies   Allergen Reactions   • Penicillins Rash        Social History     Socioeconomic History   • Marital status:    Tobacco Use   • Smoking status: Never   • Smokeless tobacco: Never   Vaping Use   • Vaping Use: Never used   Substance and Sexual Activity   • Alcohol use: Never   • Drug use: Never   • Sexual activity: Yes     Partners: Female, Male     Birth control/protection: None        Review of Systems     Objective   Vital Signs:   Pulse 62   Ht 134.6 cm (53\")   Wt 65.3 kg (144 lb)   SpO2 96%   BMI 36.04 kg/m²       Physical Exam  Constitutional:       Appearance: Normal appearance. Patient is well-developed and normal weight.   HENT:      Head: Normocephalic.      Right Ear: Hearing and external ear normal.      Left Ear: Hearing and external ear normal.      Nose: Nose normal.   Eyes:      Conjunctiva/sclera: Conjunctivae normal.   Cardiovascular:      Rate and Rhythm: Normal rate.   Pulmonary:      Effort: Pulmonary effort is normal.      Breath sounds: No wheezing or rales.   Abdominal:      Palpations: Abdomen is soft.      Tenderness: There is no abdominal tenderness.   Musculoskeletal:      Cervical back: Normal range of motion.   Skin:     Findings: No rash.   Neurological:      Mental Status: Patient is alert and oriented to person, place, and time.   Psychiatric:         Mood and Affect: Mood and affect " normal.         Judgment: Judgment normal.       Ortho Exam      RIGHT HIP: Ambulation with a cane. Surgical incision is well healing, no infection signs or drainage. Good tone of hip flexors, hip extensors, hip adductor, hip abductors. No groin pain with ambulation. Calf supple, non-tender, no signs of DVT. Sensation grossly intact. Neurovascular intact.        Procedures      Imaging Results (Most Recent)     Procedure Component Value Units Date/Time    XR Hip With or Without Pelvis 2 - 3 View Right [[id number]] Resulted: 10/11/22 0954     Updated: 10/11/22 0958           Result Review :     X-Ray Report:  Right hip(s) X-Ray  Indication: Evaluation of right hip pain   AP and Lateral view(s)  Findings: Intact right total hip arthroplasty with no evidence of wearing or loosening.   Prior studies available for comparison: yes         Assessment and Plan     Diagnoses and all orders for this visit:    1. Aftercare following right hip joint replacement surgery (Primary)  -     XR Hip With or Without Pelvis 2 - 3 View Right        Patient has several questions for me today, these were all answered to patient's satisfaction.   Avoid submerging the incision in water. Keep incision water clean and dry.   Continue therapy.   Repeat films next visit.     Call or return if worsening symptoms.    Follow Up     4 weeks       Patient was given instructions and counseling regarding her condition or for health maintenance advice. Please see specific information pulled into the AVS if appropriate.     Scribed for Robby Roche MD by Irene Trevino.  10/11/22   09:37 EDT    I have personally performed the services described in this document as scribed by the above individual and it is both accurate and complete. Robby Roche MD 10/11/22

## 2022-10-12 ENCOUNTER — TELEPHONE (OUTPATIENT)
Dept: ORTHOPEDICS | Facility: OTHER | Age: 72
End: 2022-10-12

## 2022-10-12 NOTE — TELEPHONE ENCOUNTER
Called left message for the patient stating that Dr. Roche wants her to wait for a couple more weeks before she can drive.

## 2022-10-12 NOTE — TELEPHONE ENCOUNTER
Caller: Rose Marie Galdamez    Relationship: Self    Best call back number: [phone]    What is the best time to reach you: BETWEEN 6AM-6PM    Who are you requesting to speak with (clinical staff, provider,  specific staff member): CLINICAL    Do you require a callback: YES - PT IS NEEDING TO KNOW WHEN SHE CAN DRIVE?     PT IS ALSO WANTING TO LET DR PERDOMO KNOW THAT SHE WILL ATTENDING PT BUT IT HAS TO BE ON HER SCHEDULE AS HER DAUGHTER HAS BONE CANCER AND SHE WILL NEED TO BE THERE FOR HER DAUGHTER AS WELL.    PLEASE CALL PATIENT. THANK  YOU

## 2022-11-08 ENCOUNTER — OFFICE VISIT (OUTPATIENT)
Dept: ORTHOPEDIC SURGERY | Facility: CLINIC | Age: 72
End: 2022-11-08

## 2022-11-08 VITALS — HEIGHT: 55 IN | WEIGHT: 144 LBS | BODY MASS INDEX: 33.33 KG/M2

## 2022-11-08 DIAGNOSIS — Z47.1 AFTERCARE FOLLOWING RIGHT HIP JOINT REPLACEMENT SURGERY: Primary | ICD-10-CM

## 2022-11-08 DIAGNOSIS — Z96.641 AFTERCARE FOLLOWING RIGHT HIP JOINT REPLACEMENT SURGERY: Primary | ICD-10-CM

## 2022-11-08 PROCEDURE — 99024 POSTOP FOLLOW-UP VISIT: CPT | Performed by: ORTHOPAEDIC SURGERY

## 2022-11-08 NOTE — PROGRESS NOTES
"Chief Complaint  Pain and Follow-up of the Right Hip     Subjective      Rose Marie Galdamez presents to [facility] for follow up for the right hip. Patient is s/p right total hip arthroplasty performed on 9/26/22. She reports attending therapy, this appears to be going well. Patient denies any groin pain today. She states that her incision has been doing well.        Allergies   Allergen Reactions   • Penicillins Rash        Social History     Socioeconomic History   • Marital status:    Tobacco Use   • Smoking status: Never   • Smokeless tobacco: Never   Vaping Use   • Vaping Use: Never used   Substance and Sexual Activity   • Alcohol use: Never   • Drug use: Never   • Sexual activity: Yes     Partners: Female, Male     Birth control/protection: None        Review of Systems     Objective   Vital Signs:   Ht 134.6 cm (53\")   Wt 65.3 kg (144 lb)   BMI 36.04 kg/m²       Physical Exam  Constitutional:       Appearance: Normal appearance. Patient is well-developed and normal weight.   HENT:      Head: Normocephalic.      Right Ear: Hearing and external ear normal.      Left Ear: Hearing and external ear normal.      Nose: Nose normal.   Eyes:      Conjunctiva/sclera: Conjunctivae normal.   Cardiovascular:      Rate and Rhythm: Normal rate.   Pulmonary:      Effort: Pulmonary effort is normal.      Breath sounds: No wheezing or rales.   Abdominal:      Palpations: Abdomen is soft.      Tenderness: There is no abdominal tenderness.   Musculoskeletal:      Cervical back: Normal range of motion.   Skin:     Findings: No rash.   Neurological:      Mental Status: Patient is alert and oriented to person, place, and time.   Psychiatric:         Mood and Affect: Mood and affect normal.         Judgment: Judgment normal.       Ortho Exam      RIGHT HIP: Ambulation with a cane. Surgical incision is well healing, no infection signs or drainage. Good tone of hip flexors, hip extensors, hip " adductor, hip abductors. No groin pain with ambulation. Calf supple, non-tender, no signs of DVT. Sensation grossly intact. Neurovascular intact.       Procedures      Imaging Results (Most Recent)     None           Result Review :     X-Ray Report:  Right hip(s) X-Ray  Indication: Evaluation of right hip pain   AP and Lateral view(s)  Findings: Intact right total hip arthroplasty with no evidence of wearing or loosening.   Prior studies available for comparison: yes     XR Hip With or Without Pelvis 2 - 3 View Right    Result Date: 10/11/2022  Narrative: X-Ray Report: Right hip(s) X-Ray Indication: Evaluation of right hip pain AP and Lateral view(s) Findings: Intact right total hip arthroplasty with no evidence of wearing or loosening. Prior studies available for comparison: yes                 Assessment and Plan     Diagnoses and all orders for this visit:    1. Aftercare following right hip joint replacement surgery (Primary)  -     XR Hip With or Without Pelvis 2 - 3 View Right; Future        Patient has several questions for me today, these were all answered to patient's satisfaction. Patient prescribed physical therapy for continued strengthening.  Discussed the treatment plan with the patient.         Educated on risk of smoking. Discussed options for smoking cessation.  Call or return if worsening symptoms.    Follow Up     6 - 8 weeks.        Patient was given instructions and counseling regarding her condition or for health maintenance advice. Please see specific information pulled into the AVS if appropriate.     Scribed for Robby Roche MD by Hazel Nelson MA.  11/08/22   14:07 EST    I have personally performed the services described in this document as scribed by the above individual and it is both accurate and complete. Robby Roche MD 11/10/22

## 2022-11-10 ENCOUNTER — TELEPHONE (OUTPATIENT)
Dept: ORTHOPEDIC SURGERY | Facility: CLINIC | Age: 72
End: 2022-11-10

## 2022-11-10 NOTE — TELEPHONE ENCOUNTER
Caller: PATIENT    Relationship to patient: SELF     Best call back number: [phone]    Patient is needing: PATIENT WAS CALLING TO LET DR. PERDOMO KNOW THAT SHE IS NOT GOING TO PHYSICAL THERAPY AT THIS TIME. PATIENT STATED DR. PERDOMO ORDERED HER TO DO PHYSICAL THERAPY BUT SHE IS GOING TO DECLINE AT THIS TIME FOR PERSONAL REASONS. IF YOU HAVE ANY QUESTIONS YOU CAN GIVE THE PATIENT A CALL BACK TO DISCUSS THIS. THANK YOU!

## 2023-01-03 ENCOUNTER — OFFICE VISIT (OUTPATIENT)
Dept: ORTHOPEDIC SURGERY | Facility: CLINIC | Age: 73
End: 2023-01-03
Payer: MEDICARE

## 2023-01-03 VITALS — BODY MASS INDEX: 33.33 KG/M2 | HEIGHT: 55 IN | WEIGHT: 144 LBS

## 2023-01-03 DIAGNOSIS — Z96.641 AFTERCARE FOLLOWING RIGHT HIP JOINT REPLACEMENT SURGERY: Primary | ICD-10-CM

## 2023-01-03 DIAGNOSIS — Z47.1 AFTERCARE FOLLOWING RIGHT HIP JOINT REPLACEMENT SURGERY: Primary | ICD-10-CM

## 2023-01-03 PROCEDURE — 1159F MED LIST DOCD IN RCRD: CPT | Performed by: ORTHOPAEDIC SURGERY

## 2023-01-03 PROCEDURE — 1160F RVW MEDS BY RX/DR IN RCRD: CPT | Performed by: ORTHOPAEDIC SURGERY

## 2023-01-03 PROCEDURE — 99213 OFFICE O/P EST LOW 20 MIN: CPT | Performed by: ORTHOPAEDIC SURGERY

## 2023-01-03 NOTE — PROGRESS NOTES
Chief Complaint  Pain and Follow-up of the Right Hip     Subjective      Rose Marie Galdamez presents to [facility] for follow up for the right hip. Patient is s/p right total hip arthroplasty performed on 9/26/22. She reports attending therapy, this appears to be going well. Patient denies any groin pain today. She states that her incision has been doing well. Her pain is greatly decreased. Patient does therapy at her home and is walking with a cane. She has had no falls or injury.     Allergies   Allergen Reactions   • Penicillins Rash        Social History     Socioeconomic History   • Marital status:    Tobacco Use   • Smoking status: Never   • Smokeless tobacco: Never   Vaping Use   • Vaping Use: Never used   Substance and Sexual Activity   • Alcohol use: Never   • Drug use: Never   • Sexual activity: Yes     Partners: Female, Male     Birth control/protection: None        Review of Systems     Objective   Vital Signs:   Ht 134.6 cm (53\")   Wt 65.3 kg (144 lb)   BMI 36.04 kg/m²       Physical Exam  Constitutional:       Appearance: Normal appearance. Patient is well-developed and normal weight.   HENT:      Head: Normocephalic.      Right Ear: Hearing and external ear normal.      Left Ear: Hearing and external ear normal.      Nose: Nose normal.   Eyes:      Conjunctiva/sclera: Conjunctivae normal.   Cardiovascular:      Rate and Rhythm: Normal rate.   Pulmonary:      Effort: Pulmonary effort is normal.      Breath sounds: No wheezing or rales.   Abdominal:      Palpations: Abdomen is soft.      Tenderness: There is no abdominal tenderness.   Musculoskeletal:      Cervical back: Normal range of motion.   Skin:     Findings: No rash.   Neurological:      Mental Status: Patient is alert and oriented to person, place, and time.   Psychiatric:         Mood and Affect: Mood and affect normal.         Judgment: Judgment normal.       Ortho Exam         RIGHT HIP: Ambulation with  a cane. Surgical incision is well healing, no infection signs or drainage. Good tone of hip flexors, hip extensors, hip adductor, hip abductors. No groin pain with ambulation. Calf supple, non-tender, no signs of DVT. Sensation grossly intact. Neurovascular intact.     Procedures      Imaging Results (Most Recent)     None           Result Review :     X-Ray Report:  Right hip X-Ray  Indication: Evaluation of the right hip.   AP/Lateral view(s)  Findings: No signs of loosening, subsidence or periprosthetic fracture.   Prior studies available for comparison: Yes            Assessment and Plan     Diagnoses and all orders for this visit:    1. Aftercare following right hip joint replacement surgery (Primary)  -     XR Hip With or Without Pelvis 2 - 3 View Right; Future      Discussed the treatment plan with the patient. Continue HEP exercises on her own.  She has occasional pain and that should get less and less.  Discussed activity modification to prevent further injury and decrease pain.     Call or return if worsening symptoms.    Follow Up     6 months.       Patient was given instructions and counseling regarding her condition or for health maintenance advice. Please see specific information pulled into the AVS if appropriate.     Scribed for Robby Roche MD by Hazel Nelson MA.  01/03/23   13:15 EST    I have personally performed the services described in this document as scribed by the above individual and it is both accurate and complete. Robby Roche MD 01/03/23

## 2023-09-05 ENCOUNTER — OFFICE VISIT (OUTPATIENT)
Dept: ORTHOPEDIC SURGERY | Facility: CLINIC | Age: 73
End: 2023-09-05
Payer: MEDICARE

## 2023-09-05 VITALS
WEIGHT: 156 LBS | HEART RATE: 94 BPM | OXYGEN SATURATION: 95 % | BODY MASS INDEX: 36.1 KG/M2 | DIASTOLIC BLOOD PRESSURE: 80 MMHG | HEIGHT: 55 IN | SYSTOLIC BLOOD PRESSURE: 149 MMHG

## 2023-09-05 DIAGNOSIS — Z98.890 HISTORY OF ARTHROPLASTY OF RIGHT HIP: Primary | ICD-10-CM

## 2023-09-05 NOTE — PATIENT INSTRUCTIONS
X-rays taken and reviewed with patient showing an intact total hip arthroplasty.    Advised on dental prophylaxis.    Continue home exercises as needed.    Follow-up in 1 year for repeat x-rays and annual checkup.  Call for questions or concerns.

## 2023-09-05 NOTE — PROGRESS NOTES
"Chief Complaint  Follow-up of the Right Hip    Subjective      Rose Marie Galdamez presents to [facility] for annual follow-up of right total hip arthroplasty with Dr. Roche on 9/26/2022.  Patient is no longer in physical therapy and reports that she has returned to her normal activities.  She is ambulatory with use of a cane and nonantalgic gait.  Overall she is doing very well.    Objective   Allergies   Allergen Reactions    Penicillins Rash       Vital Signs:   /80   Pulse 94   Ht 134.6 cm (53\")   Wt 70.8 kg (156 lb)   SpO2 95%   BMI 39.05 kg/m²     Please follow-up with PCP regarding blood pressure.  Physical Exam    Constitutional: Awake, alert. Well nourished appearance.    Integumentary: Warm, dry, intact. No obvious rashes.    HENT: Atraumatic, normocephalic.   Respiratory: Non labored respirations .   Cardiovascular: Intact peripheral pulses.    Psychiatric: Normal mood and affect. A&O X3    Ortho Exam  Right hip: 4/5 strength to hip flexion, extension, abduction and adduction.  No pain elicited with internal and external rotation of the hip.  Sensation is intact throughout.  No numbness or tingling.  Pulses are intact.    Imaging Results (Most Recent)       Procedure Component Value Units Date/Time    XR Hip With or Without Pelvis 2 - 3 View Right [[id number]] Resulted: 09/05/23 1458     Updated: 09/05/23 1458    Narrative:      X-Ray Report:  Study: X-rays ordered, taken in the office, and reviewed today.   Site: Right hip xray  Indication: GERSON  View: AP/Lateral view(s)  Findings: Intact right total hip arthroplasty. No evidence of hardware   malfunction or periprosthetic fractures.   Prior studies available for comparison: yes                       Assessment and Plan   Problem List Items Addressed This Visit    None  Visit Diagnoses       History of arthroplasty of right hip    -  Primary    Relevant Orders    XR Hip With or Without Pelvis 2 - 3 View Right " (Completed)            Follow Up   Return for Annual Recheck.    Patient is a non-smoker, did not discuss options for smoking cessation.     Social History     Socioeconomic History    Marital status:    Tobacco Use    Smoking status: Never    Smokeless tobacco: Never   Vaping Use    Vaping Use: Never used   Substance and Sexual Activity    Alcohol use: Never    Drug use: Never    Sexual activity: Yes     Partners: Female, Male     Birth control/protection: None       Patient Instructions   X-rays taken and reviewed with patient showing an intact total hip arthroplasty.    Advised on dental prophylaxis.    Continue home exercises as needed.    Follow-up in 1 year for repeat x-rays and annual checkup.  Call for questions or concerns.  Patient was given instructions and counseling regarding her condition or for health maintenance advice. Please see specific information pulled into the AVS if appropriate.

## 2024-07-22 ENCOUNTER — TELEPHONE (OUTPATIENT)
Dept: SURGERY | Facility: CLINIC | Age: 74
End: 2024-07-22

## 2024-07-22 NOTE — TELEPHONE ENCOUNTER
"Provider: DR AHN    Caller: Rose Marie Galdamez \"Tierney\"     Relationship to Patient: SELF    Phone Number: [phone]    Reason for Call: APPT TODAY WAS  CANCELLED. PT IS SICK AND WILL CALL TO RESCHEDULE.     "

## 2024-08-05 ENCOUNTER — OFFICE VISIT (OUTPATIENT)
Dept: SURGERY | Facility: CLINIC | Age: 74
End: 2024-08-05
Payer: MEDICARE

## 2024-08-05 VITALS — BODY MASS INDEX: 34.48 KG/M2 | HEIGHT: 55 IN | RESPIRATION RATE: 18 BRPM | WEIGHT: 149 LBS

## 2024-08-05 DIAGNOSIS — K64.1 GRADE II HEMORRHOIDS: Primary | ICD-10-CM

## 2024-08-05 PROCEDURE — 1159F MED LIST DOCD IN RCRD: CPT | Performed by: SURGERY

## 2024-08-05 PROCEDURE — 99203 OFFICE O/P NEW LOW 30 MIN: CPT | Performed by: SURGERY

## 2024-08-05 PROCEDURE — 1160F RVW MEDS BY RX/DR IN RCRD: CPT | Performed by: SURGERY

## 2024-08-05 RX ORDER — SODIUM CHLORIDE 0.9 % (FLUSH) 0.9 %
10 SYRINGE (ML) INJECTION AS NEEDED
OUTPATIENT
Start: 2024-08-05

## 2024-08-05 RX ORDER — ONDANSETRON 2 MG/ML
4 INJECTION INTRAMUSCULAR; INTRAVENOUS EVERY 6 HOURS PRN
OUTPATIENT
Start: 2024-08-05

## 2024-08-05 RX ORDER — SODIUM CHLORIDE, SODIUM LACTATE, POTASSIUM CHLORIDE, CALCIUM CHLORIDE 600; 310; 30; 20 MG/100ML; MG/100ML; MG/100ML; MG/100ML
70 INJECTION, SOLUTION INTRAVENOUS CONTINUOUS
OUTPATIENT
Start: 2024-08-05

## 2024-08-05 RX ORDER — TRAMADOL HYDROCHLORIDE 50 MG/1
TABLET ORAL
COMMUNITY
Start: 2023-09-01

## 2024-08-05 RX ORDER — SODIUM CHLORIDE 0.9 % (FLUSH) 0.9 %
10 SYRINGE (ML) INJECTION EVERY 12 HOURS SCHEDULED
OUTPATIENT
Start: 2024-08-05

## 2024-08-05 RX ORDER — SODIUM CHLORIDE 9 MG/ML
40 INJECTION, SOLUTION INTRAVENOUS AS NEEDED
OUTPATIENT
Start: 2024-08-05

## 2024-08-05 NOTE — PROGRESS NOTES
Inpatient History and Physical Surgical Orders    Preadmission Location:   Preadmission Time:  Facility:  Surgery Date:  Surgery Time:  Preadmission Test date:     Chief Complaint  Outpatient History and Physical / Surgical Orders    Primary Care Provider: Elizabeth Rodriguez MD    Referring Provider: Elizabeth Rodriguez MD    Subjective      Patient Name: Rose Marie Galdamez : 1950    HPI  The patient is a 74-year-old female that was referred for hemorrhoids.  She had a colonoscopy by Dr. Sammi Pryor a few years ago and was noted to have some internal hemorrhoids on that study.  She is not really bleeding or having a lot of pain but she does have issues with seepage and keeping clean.    Past History:  Medical History: has a past medical history of Anxiety, Arthritis, Asthma, Depression, Hemorrhoid, HTN (hypertension), Neuropathy, and Right hip pain.   Surgical History: has a past surgical history that includes Carpal tunnel release (Bilateral); Colonoscopy; Hysterectomy; Cholecystectomy; Cataract extraction (Bilateral); Esophagus surgery; Laparoscopic tubal ligation; and Total hip arthroplasty (Right, 2022).   Family History: family history includes Asthma in her brother; Cancer in her mother; Diabetes in her brother; Lung cancer in her mother.   Social History: reports that she has never smoked. She has never used smokeless tobacco. She reports that she does not drink alcohol and does not use drugs.  Allergies: Penicillins and Alendronate sodium       Current Outpatient Medications:     albuterol sulfate  (90 Base) MCG/ACT inhaler, Inhale 2 puffs Every 4 (Four) Hours As Needed for Wheezing., Disp: , Rfl:     amitriptyline (ELAVIL) 25 MG tablet, Take 1 tablet by mouth Every Night., Disp: , Rfl:     apixaban (ELIQUIS) 2.5 MG tablet tablet, Take 1 tablet by mouth Every 12 (Twelve) Hours. Once eliquis is completed, she may then restart her daily baby aspirin, Disp: 20 tablet, Rfl: 0    Aspirin 81 MG  "capsule, Take by oral route., Disp: , Rfl:     Calcium Carbonate-Vitamin D (calcium-vitamin D) 500-200 MG-UNIT tablet per tablet, Every 8 (Eight) Hours., Disp: , Rfl:     celecoxib (CeleBREX) 200 MG capsule, Take 1 capsule by mouth Daily., Disp: , Rfl:     Diclofenac Sodium (VOLTAREN) 1 % gel gel, diclofenac 1 % topical gel, Disp: , Rfl:     HYDROcodone-acetaminophen (HYCET) 7.5-325 MG/15ML solution, 1-2 tablet EVERY 4 HOURS (route: oral), Disp: , Rfl:     HYDROcodone-acetaminophen (Norco) 7.5-325 MG per tablet, Take 1-2 tablets by mouth Every 4 (Four) Hours As Needed for Moderate Pain., Disp: 40 tablet, Rfl: 0    LORazepam (ATIVAN) 0.5 MG tablet, Take 1 tablet by mouth 2 (Two) Times a Day As Needed., Disp: , Rfl:     losartan-hydrochlorothiazide (HYZAAR) 100-25 MG per tablet, Take 1 tablet by mouth Daily., Disp: , Rfl:     montelukast (SINGULAIR) 10 MG tablet, Take 1 tablet by mouth Every Night., Disp: , Rfl:     potassium chloride 10 MEQ CR tablet, Take 1 tablet by mouth Daily., Disp: , Rfl:     sertraline (ZOLOFT) 100 MG tablet, Take 1 tablet by mouth Daily., Disp: , Rfl:     traMADol (ULTRAM) 50 MG tablet, Take 1 tablet every 6 hours by oral route for 30 days., Disp: , Rfl:     traZODone (DESYREL) 50 MG tablet, Take 1 tablet by mouth Every Night., Disp: , Rfl:        Objective   Vital Signs:   Resp 18   Ht 134.6 cm (52.99\")   Wt 67.6 kg (149 lb)   BMI 37.31 kg/m²       Physical Exam  Vitals and nursing note reviewed.   Constitutional:       Appearance: Normal appearance. The patient is well-developed.   Cardiovascular:      Rate and Rhythm: Normal rate and regular rhythm.   Pulmonary:      Effort: Pulmonary effort is normal.      Breath sounds: Normal air entry.   Abdominal:      General: Bowel sounds are normal.      Palpations: Abdomen is soft.      Skin:     General: Skin is warm and dry.   Neurological:      Mental Status: The patient is alert and oriented to person, place, and time.      Motor: Motor " function is intact.   Psychiatric:         Mood and Affect: Mood normal.   Rectal: She does not have large hemorrhoids externally    Result Review :               Assessment and Plan   Diagnoses and all orders for this visit:    1. Grade II hemorrhoids (Primary)  -     Case Request; Standing  -     Follow Anesthesia Guidelines / Protocol; Standing  -     Verify NPO Status; Standing  -     Obtain Informed Consent; Standing  -     Verify / Perform Chlorhexidine Skin Prep; Standing  -     Insert Peripheral IV; Standing  -     Saline Lock & Maintain IV Access; Standing  -     sodium chloride 0.9 % flush 10 mL  -     sodium chloride 0.9 % flush 10 mL  -     sodium chloride 0.9 % infusion 40 mL  -     lactated ringers infusion  -     Place Sequential Compression Device; Standing  -     Maintain Sequential Compression Device; Standing  -     ondansetron (ZOFRAN) injection 4 mg  -     ceFAZolin (ANCEF) 2,000 mg in sodium chloride 0.9 % 100 mL IVPB  -     Case Request    I will go ahead and schedule her for a rectal exam under anesthesia and a hemorrhoid banding.  I described the procedure to her as well as the risk and benefits and she is agreeable to proceeding.    I  Vladimir Govea MD  08/05/2024

## 2024-08-20 NOTE — PRE-PROCEDURE INSTRUCTIONS
PATIENT INSTRUCTED TO BE:    - NOTHING TO EAT AFTER MIDNIGHT OR CHEW, EXCEPT CAN HAVE CLEAR LIQUIDS 2 HOURS PRIOR TO SURGERY ARRIVAL TIME , NO MORE THAN 8 OZ. (NOTHING RED)     - TO HOLD ALL VITAMINS, SUPPLEMENTS, NSAIDS FOR ONE WEEK PRIOR TO THEIR SURGICAL PROCEDURE    - INSTRUCTED PT TO USE SURGICAL SOAP 1 TIME THE NIGHT PRIOR TO SURGERY __8/20_________ OR THE AM OF SURGERY ____8/21_________   USE THE SOAP FROM NECK TO TOES, AVOID THEIR FACE, HAIR, AND PRIVATE PARTS. IF USE THE SOAP THE NIGHT PRIOR TO SURGERY, CHANGE BED LINENS AND NO PETS IN THE BED.     INSTRUCTED NO LOTIONS, JEWELRY, PIERCINGS,  NAIL POLISH, OR DEODORANT DAY OF SURGERY    - IF DIABETIC, CHECK BLOOD GLUCOSE IF LESS THAN 70 OR HAVING SYMPTOMS CALL THE PREOP AREA FOR INSTRUCTIONS ON AM OF SURGERY ([phone]    -INSTRUCTED TO TAKE THE FOLLOWING MEDICATIONS THE DAY OF SURGERY WITH SIPS OF WATER: LORAZEPAM IF NEEDED,  SERTRALINE    - DO NOT BRING ANY MEDICATIONS WITH YOU TO THE HOSPITAL THE DAY OF SURGERY, EXCEPT IF USE INHALERS. BRING INHALERS DAY OF SURGERY       - BRING CPAP OR BIPAP TO THE HOSPITAL ONLY IF YOU ARE SPENDING THE NIGHT    - DO NOT SMOKE OR VAPE 24 HOURS PRIOR TO PROCEDURE PER ANESTHESIA REQUEST     -MAKE SURE YOU HAVE A RIDE HOME OR SOMEONE TO STAY WITH YOU THE DAY OF THE PROCEDURE AFTER YOU GO HOME     - FOLLOW ANY OTHER INSTRUCTIONS GIVEN TO YOU BY YOUR SURGEON'S OFFICE.     - DAY OF SURGERY _8/21_______, COME TO [facility], [facility] FLOOR. CHECK IN AT THE DESK FOR REGISTRATION/SURGERY    - YOU WILL RECEIVE A PHONE CALL THE DAY PRIOR TO SURGERY BETWEEN 1PM AND 4 PM WITH ARRIVAL TIME, IF YOUR SURGERY IS ON A MONDAY YOU WILL RECEIVE A CALL THE FRIDAY PRIOR TO SURGERY DATE    - BRING CASH OR CREDIT CARD FOR COPAYMENT OF MEDICATIONS AFTER SURGERY IF YOU USE THE HOSPITAL PHARMACY ([facility] TO BED)    - PREADMISSION TESTING NURSE JAYLAN BARNES [phone] IF HAVE ANY QUESTIONS     -PATIENT PROVIDED THE NUMBER FOR PREOP SURGICAL  DEPT IF HAD QUESTIONS AFTER HOURS PRIOR TO SURGERY ( [phone]).  INFORMED PT IF NO ANSWER, LEAVE A MESSAGE AND SOMEONE WILL RETURN THEIR CALL       PATIENT VERBALIZED UNDERSTANDING       Clear Liquid Diet        Find out when you need to start a clear liquid diet.   Think of “clear liquids” as anything you could read a newspaper through. This includes things like water, broth, sports drinks, or tea WITHOUT any kind of milk or cream.           Once you are told to start a clear liquid diet, only drink these things until 2 hours before arrival to the hospital or when the hospital says to stop. Total volume limitation: 8 oz.       Clear liquids you CAN drink:   Water   Clear broth: beef, chicken, vegetable, or bone broth with nothing in it   Gatorade   Lemonade or Yovani-aid   Soda   Tea, coffee (NO cream or honey)   Jell-O (without fruit)   Popsicles (without fruit or cream)   Italian ices   Juice without pulp: apple, white, grape   You may use salt, pepper, and sugar  NO RED  NO NOODLES    Do NOT drink:   Milk or cream   Soy milk, almond milk, coconut milk, or other non-dairy drinks and   creamers   Milkshakes or smoothies   Tomato juice   Orange juice   Grapefruit juice   Cream soups or any other than broth         Clear Liquid Diet:  Do NOT eat any solid food.  Do NOT eat or suck on mints or candy.  Do NOT chew gum.  Do NOT drink thick liquids like milk or juice with pulp in it.  Do NOT add milk, cream, or anything like soy milk or almond milk to coffee or tea.

## 2024-08-21 ENCOUNTER — ANESTHESIA EVENT (OUTPATIENT)
Dept: PERIOP | Facility: HOSPITAL | Age: 74
End: 2024-08-21
Payer: MEDICARE

## 2024-08-21 ENCOUNTER — HOSPITAL ENCOUNTER (OUTPATIENT)
Facility: HOSPITAL | Age: 74
Setting detail: HOSPITAL OUTPATIENT SURGERY
Discharge: HOME | End: 2024-08-21
Attending: SURGERY | Admitting: SURGERY
Payer: MEDICARE

## 2024-08-21 ENCOUNTER — ANESTHESIA (OUTPATIENT)
Dept: PERIOP | Facility: HOSPITAL | Age: 74
End: 2024-08-21
Payer: MEDICARE

## 2024-08-21 VITALS
WEIGHT: 146.16 LBS | RESPIRATION RATE: 16 BRPM | DIASTOLIC BLOOD PRESSURE: 69 MMHG | HEART RATE: 75 BPM | HEIGHT: 55 IN | BODY MASS INDEX: 33.83 KG/M2 | OXYGEN SATURATION: 100 % | SYSTOLIC BLOOD PRESSURE: 116 MMHG | TEMPERATURE: 97.4 F

## 2024-08-21 DIAGNOSIS — K64.1 GRADE II HEMORRHOIDS: ICD-10-CM

## 2024-08-21 PROCEDURE — 25010000002 PROPOFOL 10 MG/ML EMULSION: Performed by: NURSE ANESTHETIST, CERTIFIED REGISTERED

## 2024-08-21 PROCEDURE — 25010000002 MIDAZOLAM PER 1MG: Performed by: ANESTHESIOLOGY

## 2024-08-21 PROCEDURE — 46221 LIGATION OF HEMORRHOID(S): CPT | Performed by: SURGERY

## 2024-08-21 PROCEDURE — 25810000003 LACTATED RINGERS PER 1000 ML: Performed by: ANESTHESIOLOGY

## 2024-08-21 RX ORDER — ONDANSETRON 2 MG/ML
4 INJECTION INTRAMUSCULAR; INTRAVENOUS EVERY 6 HOURS PRN
Status: DISCONTINUED | OUTPATIENT
Start: 2024-08-21 | End: 2024-08-21 | Stop reason: SDUPTHER

## 2024-08-21 RX ORDER — ONDANSETRON 2 MG/ML
4 INJECTION INTRAMUSCULAR; INTRAVENOUS ONCE AS NEEDED
Status: DISCONTINUED | OUTPATIENT
Start: 2024-08-21 | End: 2024-08-21 | Stop reason: SDUPTHER

## 2024-08-21 RX ORDER — OXYCODONE HYDROCHLORIDE 5 MG/1
5 TABLET ORAL
Status: DISCONTINUED | OUTPATIENT
Start: 2024-08-21 | End: 2024-08-21 | Stop reason: HOSPADM

## 2024-08-21 RX ORDER — IBUPROFEN 600 MG/1
600 TABLET, FILM COATED ORAL EVERY 6 HOURS PRN
Status: DISCONTINUED | OUTPATIENT
Start: 2024-08-21 | End: 2024-08-21 | Stop reason: HOSPADM

## 2024-08-21 RX ORDER — MEPERIDINE HYDROCHLORIDE 25 MG/ML
12.5 INJECTION INTRAMUSCULAR; INTRAVENOUS; SUBCUTANEOUS
Status: DISCONTINUED | OUTPATIENT
Start: 2024-08-21 | End: 2024-08-21 | Stop reason: HOSPADM

## 2024-08-21 RX ORDER — ONDANSETRON 2 MG/ML
4 INJECTION INTRAMUSCULAR; INTRAVENOUS ONCE AS NEEDED
Status: DISCONTINUED | OUTPATIENT
Start: 2024-08-21 | End: 2024-08-21 | Stop reason: HOSPADM

## 2024-08-21 RX ORDER — SODIUM CHLORIDE, SODIUM LACTATE, POTASSIUM CHLORIDE, CALCIUM CHLORIDE 600; 310; 30; 20 MG/100ML; MG/100ML; MG/100ML; MG/100ML
9 INJECTION, SOLUTION INTRAVENOUS CONTINUOUS PRN
Status: DISCONTINUED | OUTPATIENT
Start: 2024-08-21 | End: 2024-08-21 | Stop reason: HOSPADM

## 2024-08-21 RX ORDER — ACETAMINOPHEN 500 MG
1000 TABLET ORAL ONCE
Status: COMPLETED | OUTPATIENT
Start: 2024-08-21 | End: 2024-08-21

## 2024-08-21 RX ORDER — PROPOFOL 10 MG/ML
VIAL (ML) INTRAVENOUS AS NEEDED
Status: DISCONTINUED | OUTPATIENT
Start: 2024-08-21 | End: 2024-08-21 | Stop reason: SURG

## 2024-08-21 RX ORDER — ONDANSETRON 4 MG/1
4 TABLET, ORALLY DISINTEGRATING ORAL ONCE AS NEEDED
Status: DISCONTINUED | OUTPATIENT
Start: 2024-08-21 | End: 2024-08-21 | Stop reason: HOSPADM

## 2024-08-21 RX ORDER — PROMETHAZINE HYDROCHLORIDE 12.5 MG/1
25 TABLET ORAL ONCE AS NEEDED
Status: DISCONTINUED | OUTPATIENT
Start: 2024-08-21 | End: 2024-08-21 | Stop reason: HOSPADM

## 2024-08-21 RX ORDER — PROMETHAZINE HYDROCHLORIDE 25 MG/1
25 SUPPOSITORY RECTAL ONCE AS NEEDED
Status: DISCONTINUED | OUTPATIENT
Start: 2024-08-21 | End: 2024-08-21 | Stop reason: HOSPADM

## 2024-08-21 RX ORDER — MIDAZOLAM HYDROCHLORIDE 2 MG/2ML
1 INJECTION, SOLUTION INTRAMUSCULAR; INTRAVENOUS ONCE
Status: COMPLETED | OUTPATIENT
Start: 2024-08-21 | End: 2024-08-21

## 2024-08-21 RX ORDER — TRAMADOL HYDROCHLORIDE 50 MG/1
50 TABLET ORAL EVERY 6 HOURS PRN
Qty: 10 TABLET | Refills: 0 | Status: SHIPPED | OUTPATIENT
Start: 2024-08-21

## 2024-08-21 RX ORDER — LIDOCAINE HYDROCHLORIDE 20 MG/ML
INJECTION, SOLUTION EPIDURAL; INFILTRATION; INTRACAUDAL; PERINEURAL AS NEEDED
Status: DISCONTINUED | OUTPATIENT
Start: 2024-08-21 | End: 2024-08-21 | Stop reason: SURG

## 2024-08-21 RX ADMIN — ACETAMINOPHEN 1000 MG: 500 TABLET ORAL at 10:03

## 2024-08-21 RX ADMIN — LIDOCAINE HYDROCHLORIDE 100 MG: 20 INJECTION, SOLUTION INTRAVENOUS at 11:04

## 2024-08-21 RX ADMIN — MIDAZOLAM HYDROCHLORIDE 1 MG: 1 INJECTION, SOLUTION INTRAMUSCULAR; INTRAVENOUS at 10:45

## 2024-08-21 RX ADMIN — SODIUM CHLORIDE, POTASSIUM CHLORIDE, SODIUM LACTATE AND CALCIUM CHLORIDE 9 ML/HR: 600; 310; 30; 20 INJECTION, SOLUTION INTRAVENOUS at 10:03

## 2024-08-21 RX ADMIN — PROPOFOL 50 MG: 10 INJECTION, EMULSION INTRAVENOUS at 11:05

## 2024-08-21 RX ADMIN — PROPOFOL 30 MG: 10 INJECTION, EMULSION INTRAVENOUS at 11:17

## 2024-08-21 RX ADMIN — PROPOFOL 20 MG: 10 INJECTION, EMULSION INTRAVENOUS at 11:10

## 2024-08-21 RX ADMIN — PROPOFOL 30 MG: 10 INJECTION, EMULSION INTRAVENOUS at 11:07

## 2024-08-21 RX ADMIN — PROPOFOL 150 MCG/KG/MIN: 10 INJECTION, EMULSION INTRAVENOUS at 11:05

## 2024-08-21 NOTE — OP NOTE
HEMORRHOID BANDING  Procedure Report    Patient Name:  Rose Marie Galdamez  YOB: 1950    Date of Surgery:  8/21/2024     Indications: The patient is a 74-year-old female that presented with symptomatic internal hemorrhoids.  The decision was made to proceed with a rectal exam under anesthesia with hemorrhoid banding.    Pre-op Diagnosis: Hemorrhoids    Post-Op Diagnosis: Same    Procedure/CPT® Codes:    Rectal exam under anesthesia with 3 column hemorrhoid banding    Staff:  Surgeon(s):  Vladimir Govea MD    Assistant: Nadia Mathew CRNFA    Anesthesia: Monitored Anesthesia Care    Estimated Blood Loss: none    Implants:    Nothing was implanted during the procedure    Specimen:          None        Findings: Internal hemorrhoids in 3 columns    Complications: None    Description of Procedure: The patient was taken to the operating room and placed on the table in supine position.  After administration of MAC anesthesia, the patient was placed in lithotomy position and the perianal area was prepped and draped.  A rectal exam was performed and was noted to be normal.  An anoscopic exam was performed and we identified sizable internal hemorrhoids in all 3 columns.  Using a mechanical hemorrhoid , bands were applied to the internal component of the right posterior lateral, right anterior lateral and left lateral hemorrhoid columns.  Sterile dressings were applied and she was taken the postanesthesia recovery room in stable condition.    Assistant: Nadia Mathew CRNFA  was responsible for performing the following activities: Retraction and Placing Dressing and their skilled assistance was necessary for the success of this case.    Vladimir Govea MD     Date: 8/21/2024  Time: 11:30 EDT

## 2024-08-21 NOTE — DISCHARGE INSTRUCTIONS
DISCHARGE INSTRUCTIONS  SURGICAL / AMBULATORY  PROCEDURES      For your surgery you had:  General anesthesia (you may have a sore throat for the first 24 hours)  IV sedation.  Local anesthesia  Monitored anesthesia Care  You received a medicated patch for nausea prevention today (behind your ear). It is recommended that you remove it 24-48 hours post-operatively. It must be removed within 72 hours.   You have received an anesthesia medication today that can cause hormonal forms of birth control to be ineffective. You should use a different form of birth control (to prevent pregnancy) for 7 days.   You may experience dizziness, drowsiness, or light-headedness for several hours following surgery/procedure.  Do not stay alone today or tonight.  Limit your activity for 24 hours.  Resume your diet slowly.  Follow whatever special dietary instructions you may have been given by your doctor.  You should not drive or operate machinery, drink alcohol, or sign legally binding documents for 24 hours or while you are taking pain medication.  Last dose of pain medication was given at:   .  NOTIFY YOUR DOCTOR IF YOU EXPERIENCE ANY OF THE FOLLOWING:  Temperature greater than 101 degrees Fahrenheit  Shaking Chills  Redness or excessive drainage from incision  Nausea, vomiting and/or pain that is not controlled by prescribed medications  Increase in bleeding or bleeding that is excessive  Unable to urinate in 6 hours after surgery  If unable to reach your doctor, please go to the closest Emergency Room    You may remove Band-Aid/dressing in 48 hours.  You may shower in 48 hours .  Apply an ice pack 24-48 hours.  Medications per physician instructions as indicated on Discharge Medication Information Sheet.  You should see   for follow-up care   on   .  Phone number:       SPECIAL INSTRUCTIONS:

## 2024-08-21 NOTE — ANESTHESIA POSTPROCEDURE EVALUATION
Patient: Rose Marie Galdamez    Procedure Summary       Date: 08/21/24 Room / Location: [facility]  / [facility]    Anesthesia Start: 1101 Anesthesia Stop: 1131    Procedure: HEMORRHOID BANDING times three (Rectum) Diagnosis:       Grade II hemorrhoids      (Grade II hemorrhoids [K64.1])    Surgeons: Vladimir Govea MD Provider: Ernie Rodriguez MD    Anesthesia Type: general ASA Status: 2            Anesthesia Type: general    Vitals  Vitals Value Taken Time   /69 08/21/24 1200   Temp 36.1 °C (97 °F) 08/21/24 1130   Pulse 74 08/21/24 1200   Resp 16 08/21/24 1200   SpO2 99 % 08/21/24 1200           Post Anesthesia Care and Evaluation    Patient location during evaluation: bedside  Patient participation: complete - patient participated  Level of consciousness: awake  Pain management: adequate    Airway patency: patent  PONV Status: none  Cardiovascular status: acceptable and stable  Respiratory status: acceptable  Hydration status: acceptable

## 2024-08-21 NOTE — ANESTHESIA PREPROCEDURE EVALUATION
Anesthesia Evaluation     Patient summary reviewed and Nursing notes reviewed   no history of anesthetic complications:   NPO Solid Status: > 8 hours  NPO Liquid Status: > 2 hours           Airway   Mallampati: I  TM distance: >3 FB  Neck ROM: full  No difficulty expected  Dental    (+) poor dentition        Pulmonary - negative pulmonary ROS and normal exam    breath sounds clear to auscultation  (+) asthma,  Cardiovascular - negative cardio ROS and normal exam  Exercise tolerance: good (4-7 METS)    Rhythm: regular  Rate: normal    (+) hypertension      Neuro/Psych- negative ROS  GI/Hepatic/Renal/Endo - negative ROS     Musculoskeletal     Abdominal    Substance History      OB/GYN          Other        ROS/Med Hx Other: PAT Nursing Notes unavailable.               Anesthesia Plan    ASA 2     general     (Patient understands anesthesia not responsible for dental damage.)  intravenous induction     Anesthetic plan, risks, benefits, and alternatives have been provided, discussed and informed consent has been obtained with: patient.    Plan discussed with CRNA.    CODE STATUS:

## 2024-08-22 ENCOUNTER — TELEPHONE (OUTPATIENT)
Dept: SURGERY | Facility: CLINIC | Age: 74
End: 2024-08-22
Payer: MEDICARE

## 2024-08-22 NOTE — TELEPHONE ENCOUNTER
Rectal exam under anesthesia with 3 column hemorrhoid banding 08/21/24.    PATIENT CALLED AND SAID SHE HAD HEMORRHOID BANDING YESTERDAY.  SHE DID NOT HAVE ANY BLOOD AT ALL YESTERDAY.  SHE ONLY PASSED GAS.    TODAY, SHE PASSED GAS AND THERE WAS A LITTLE BLOOD ON THE TISSUE.    SHE WANTS TO KNOW WHAT SHE SHOULD DO AND WHAT SHE SHOULD EXPECT AFTER THIS SURGERY.    #[phone]

## 2024-09-06 ENCOUNTER — OFFICE VISIT (OUTPATIENT)
Dept: SURGERY | Facility: CLINIC | Age: 74
End: 2024-09-06
Payer: MEDICARE

## 2024-09-06 VITALS — WEIGHT: 148 LBS | BODY MASS INDEX: 34.25 KG/M2 | RESPIRATION RATE: 14 BRPM | HEIGHT: 55 IN

## 2024-09-06 DIAGNOSIS — K64.1 GRADE II HEMORRHOIDS: Primary | ICD-10-CM

## 2024-09-06 RX ORDER — LIDOCAINE 50 MG/G
OINTMENT TOPICAL
COMMUNITY
Start: 2024-07-12

## 2024-09-06 RX ORDER — AMITRIPTYLINE HYDROCHLORIDE 50 MG/1
1 TABLET ORAL DAILY
COMMUNITY
Start: 2024-05-22

## 2024-09-06 NOTE — PROGRESS NOTES
Chief Complaint  Hemorrhoids and Post-op    Subjective          Rose Marie Galdamez presents to [facility]  History of Present Illness    Rose Marie Galdamez is a 74 y.o. female  who presents today for a postoperative visit.     Patient is here for a follow-up after 3 quadrant hemorrhoid banding.  She is doing really well.  She is not having any pain.  She is having some minimal spotting when she wipes.    Past History:  Medical History: has a past medical history of Anxiety, Arthritis, Asthma, Depression, Hemorrhoid, HTN (hypertension), Neuropathy, and Right hip pain.   Surgical History: has a past surgical history that includes Carpal tunnel release (Bilateral); Colonoscopy; Hysterectomy; Cholecystectomy; Cataract extraction (Bilateral); Esophagus surgery; Laparoscopic tubal ligation; Total hip arthroplasty (Right, 9/26/2022); and Hemorrhoid surgery (N/A, 8/21/2024).   Family History: family history includes Asthma in her brother; Cancer in her mother; Diabetes in her brother; Lung cancer in her mother.   Social History: reports that she has never smoked. She has never used smokeless tobacco. She reports that she does not drink alcohol and does not use drugs.  Allergies: Penicillins and Alendronate sodium       Current Outpatient Medications:     amitriptyline (ELAVIL) 25 MG tablet, Take 1 tablet by mouth Every Night., Disp: , Rfl:     amitriptyline (ELAVIL) 50 MG tablet, Take 1 tablet by mouth Daily., Disp: , Rfl:     Aspirin 81 MG capsule, Take 81 mg by mouth Daily., Disp: , Rfl:     Aspirin 81 MG capsule, Take  by mouth., Disp: , Rfl:     Calcium Carb-Cholecalciferol 600-12.5 MG-MCG capsule, , Disp: , Rfl:     Calcium Carbonate-Vitamin D (calcium-vitamin D) 500-200 MG-UNIT tablet per tablet, Take 1 tablet by mouth Daily., Disp: , Rfl:     celecoxib (CeleBREX) 200 MG capsule, Take 1 capsule by mouth Daily., Disp: , Rfl:     Diclofenac Sodium (VOLTAREN) 1 % gel gel, diclofenac 1 %  "topical gel, Disp: , Rfl:     lidocaine (XYLOCAINE) 5 % ointment, APPLY TO AFFECTED AREA(S) BY TOPICAL ROUTE 1-4 TIMES DAILY AS NEEDED, Disp: , Rfl:     LORazepam (ATIVAN) 0.5 MG tablet, Take 1 tablet by mouth 2 (Two) Times a Day As Needed., Disp: , Rfl:     losartan-hydrochlorothiazide (HYZAAR) 100-25 MG per tablet, Take 1 tablet by mouth Daily., Disp: , Rfl:     montelukast (SINGULAIR) 10 MG tablet, Take 1 tablet by mouth Every Night., Disp: , Rfl:     POTASSIUM PO, , Disp: , Rfl:     sertraline (ZOLOFT) 100 MG tablet, Take 1 tablet by mouth Daily., Disp: , Rfl:     traMADol (ULTRAM) 50 MG tablet, Take 1 tablet by mouth Every 6 (Six) Hours As Needed for Moderate Pain., Disp: 10 tablet, Rfl: 0    traZODone (DESYREL) 50 MG tablet, Take 1 tablet by mouth Every Night., Disp: , Rfl:     VITAMIN D PO, , Disp: , Rfl:        Physical Exam  Her perianal area looks good today.  Objective     Vital Signs:   Resp 14   Ht 138.4 cm (54.49\")   Wt 67.1 kg (148 lb)   BMI 35.05 kg/m²              Assessment and Plan    Diagnoses and all orders for this visit:    1. Grade II hemorrhoids (Primary)    I will see her back on an as-needed basis.  I have asked her to call me should she have any further questions or concerns.      "

## 2024-09-10 ENCOUNTER — OFFICE VISIT (OUTPATIENT)
Dept: ORTHOPEDIC SURGERY | Facility: CLINIC | Age: 74
End: 2024-09-10
Payer: MEDICARE

## 2024-09-10 VITALS
HEIGHT: 55 IN | WEIGHT: 148.37 LBS | DIASTOLIC BLOOD PRESSURE: 81 MMHG | BODY MASS INDEX: 34.34 KG/M2 | OXYGEN SATURATION: 94 % | HEART RATE: 101 BPM | SYSTOLIC BLOOD PRESSURE: 158 MMHG

## 2024-09-10 DIAGNOSIS — M25.551 RIGHT HIP PAIN: Primary | ICD-10-CM

## 2024-09-10 DIAGNOSIS — Z96.641 HISTORY OF TOTAL HIP ARTHROPLASTY, RIGHT: ICD-10-CM

## 2024-09-10 PROCEDURE — 1159F MED LIST DOCD IN RCRD: CPT

## 2024-09-10 PROCEDURE — 1160F RVW MEDS BY RX/DR IN RCRD: CPT

## 2024-09-10 PROCEDURE — 99213 OFFICE O/P EST LOW 20 MIN: CPT

## 2024-09-10 NOTE — PROGRESS NOTES
"Chief Complaint  Follow-up of the Right Hip    Subjective      Rose Marie Galdamez presents to [facility] for follow up of her right hip.  Patient is 2-year status post right total hip arthroplasty performed Dr. Roche on 9/26/2022.  Patient states that she is doing well.  She denies any concerns today.  She is ambulating with use of a cane with a nonantalgic gait.  Overall she states she is doing very well and denies any concerns today.    Allergies   Allergen Reactions    Penicillins Rash     Beta lactam allergy details  Antibiotic reaction: rash  Age at reaction: child  Dose to reaction time: unknown  Reason for antibiotic: unknown  Epinephrine required for reaction?: unknown  Tolerated antibiotics: unknown       Alendronate Sodium Unknown - Low Severity     PT STATES CAN NOT REMEMBER REACTION       Objective     Vital Signs:   Vitals:    09/10/24 1438   BP: 158/81   Pulse: 101   SpO2: 94%   Weight: 67.3 kg (148 lb 5.9 oz)   Height: 138.4 cm (54.5\")     Body mass index is 35.12 kg/m².    I reviewed the patient's chief complaint, history of present illness, review of systems, past medical history, surgical history, family history, social history, medications, and allergy list.     REVIEW OF SYSTEMS    Constitutional: Denies fevers, chills, weight loss  Cardiovascular: Denies chest pain, shortness of breath  Skin: Denies rashes, acute skin changes  Neurologic: Denies headache, loss of consciousness  MSK: Right hip pain.     Ortho Exam  Hip   General: Alert. No acute distress.  Gait: Nonantalgic gait.    Right hip:.  No pain with passive hip ROM.  Intact active hip ROM with no pain.  4/5 hip strength.  Non tender over the thigh or knee distally. Demonstrates intact active ankle dorsiflexion and plantarflexion. Demonstrates intact sensation over dorsal and plantar foot.  Calf soft, nontender. Neurovascular intact distally. Palpable pedal pulses.           Imaging Results (Most Recent)  "      Procedure Component Value Units Date/Time    XR Hip With or Without Pelvis 2 - 3 View Right [[id number]] Resulted: 09/10/24 1445     Updated: 09/10/24 1446    Narrative:      X-Ray Report:  Study: X-rays ordered, taken in the office, and reviewed today.   Site: Right Hip Xray  Indication: Right total hip arthroplasty follow-up  View: AP, frog lateral right hip  Findings: Intact right total hip arthroplasty.  No evidence of hardware   complications or loosening.  No periprosthetic fractures are visualized.    Hip joint is reduced.  Prior studies available for comparison: yes                    Assessment and Plan   Diagnoses and all orders for this visit:    1. Right hip pain (Primary)  -     XR Hip With or Without Pelvis 2 - 3 View Right    2. History of total hip arthroplasty, right         Rose Marie Galdamez is 2 year post-op right total hip arthroplasty performed by Dr Roche on 9/26/2022. X-rays reviewed, showing hardware intact and no complications.  Patient is doing well. Continue home exercise program to progress strength and ROM.     Discussed the importance of lifelong antibiotic prophylaxis with dental procedures following total joint replacement. In the event of a dental procedure, patient is welcome to contact our office to obtain antibiotics prior to the procedure if their dentist does not provide the prescription. Patient verbalized understanding.     Follow-up in 1-2 year. We will repeat xray's of the prosthetic joint at that time.   Call sooner or return to care, if needed with any changes or concerns.        Follow Up   Return in about 2 years (around 9/10/2026).  There are no Patient Instructions on file for this visit.  Patient was given instructions and counseling regarding her condition or for health maintenance advice. Please see specific information pulled into the AVS if appropriate.       Dictated Utilizing Dragon Dictation. Please note that portions of this note were completed with a  voice recognition program. Part of this note may be an electronic transcription/translation of spoken language to printed text using the Dragon Dictation System.

## 2025-05-21 ENCOUNTER — TRANSCRIBE ORDERS (OUTPATIENT)
Dept: ADMINISTRATIVE | Facility: HOSPITAL | Age: 75
End: 2025-05-21
Payer: MEDICARE

## 2025-05-21 DIAGNOSIS — R15.9 FULL INCONTINENCE OF FECES: Primary | ICD-10-CM

## 2025-05-22 ENCOUNTER — TRANSCRIBE ORDERS (OUTPATIENT)
Dept: ADMINISTRATIVE | Facility: HOSPITAL | Age: 75
End: 2025-05-22
Payer: MEDICARE

## 2025-05-22 DIAGNOSIS — Z86.73 HX-TIA (TRANSIENT ISCHEMIC ATTACK): Primary | ICD-10-CM

## 2025-06-26 ENCOUNTER — HOSPITAL ENCOUNTER (OUTPATIENT)
Dept: CT IMAGING | Facility: HOSPITAL | Age: 75
Discharge: HOME | End: 2025-06-26
Admitting: FAMILY MEDICINE
Payer: MEDICARE

## 2025-06-26 DIAGNOSIS — Z86.73 HX-TIA (TRANSIENT ISCHEMIC ATTACK): ICD-10-CM

## 2025-06-26 DIAGNOSIS — R15.9 FULL INCONTINENCE OF FECES: ICD-10-CM

## 2025-06-26 PROCEDURE — 70470 CT HEAD/BRAIN W/O & W/DYE: CPT

## 2025-06-26 PROCEDURE — 25510000001 IOPAMIDOL PER 1 ML: Performed by: FAMILY MEDICINE

## 2025-06-26 PROCEDURE — 74177 CT ABD & PELVIS W/CONTRAST: CPT

## 2025-06-26 RX ORDER — IOPAMIDOL 755 MG/ML
100 INJECTION, SOLUTION INTRAVASCULAR
Status: COMPLETED | OUTPATIENT
Start: 2025-06-26 | End: 2025-06-26

## 2025-06-26 RX ADMIN — IOPAMIDOL 92 ML: 755 INJECTION, SOLUTION INTRAVENOUS at 18:13

## 2025-08-22 ENCOUNTER — OFFICE VISIT (OUTPATIENT)
Age: 75
End: 2025-08-22
Payer: MEDICARE

## 2025-08-22 VITALS
WEIGHT: 134 LBS | HEIGHT: 55 IN | OXYGEN SATURATION: 99 % | SYSTOLIC BLOOD PRESSURE: 138 MMHG | DIASTOLIC BLOOD PRESSURE: 68 MMHG | BODY MASS INDEX: 31.01 KG/M2 | HEART RATE: 89 BPM

## 2025-08-22 DIAGNOSIS — R06.09 DYSPNEA ON EXERTION: ICD-10-CM

## 2025-08-22 DIAGNOSIS — R73.01 IMPAIRED FASTING GLUCOSE: ICD-10-CM

## 2025-08-22 DIAGNOSIS — Z00.00 WELL ADULT HEALTH CHECK: ICD-10-CM

## 2025-08-22 DIAGNOSIS — I63.9 CEREBROVASCULAR ACCIDENT (CVA), UNSPECIFIED MECHANISM: Primary | ICD-10-CM

## 2025-08-22 DIAGNOSIS — M85.851 OSTEOPENIA OF BOTH HIPS: ICD-10-CM

## 2025-08-22 DIAGNOSIS — J44.89 CHRONIC OBSTRUCTIVE BRONCHITIS: ICD-10-CM

## 2025-08-22 DIAGNOSIS — Z00.00 WELL ADULT EXAM: ICD-10-CM

## 2025-08-22 DIAGNOSIS — B35.1 ONYCHOMYCOSIS: ICD-10-CM

## 2025-08-22 DIAGNOSIS — I65.22 STENOSIS OF LEFT CAROTID ARTERY: ICD-10-CM

## 2025-08-22 DIAGNOSIS — I10 ESSENTIAL HYPERTENSION: ICD-10-CM

## 2025-08-22 DIAGNOSIS — E55.9 VITAMIN D DEFICIENCY: ICD-10-CM

## 2025-08-22 DIAGNOSIS — F31.30: ICD-10-CM

## 2025-08-22 DIAGNOSIS — M85.852 OSTEOPENIA OF BOTH HIPS: ICD-10-CM

## 2025-08-22 DIAGNOSIS — Z12.31 BREAST CANCER SCREENING BY MAMMOGRAM: ICD-10-CM

## 2025-08-22 PROBLEM — E66.01 MORBID OBESITY: Status: ACTIVE | Noted: 2017-11-12

## 2025-08-22 PROBLEM — M85.859 OSTEOPENIA OF HIP: Status: ACTIVE | Noted: 2025-08-22

## 2025-08-22 PROBLEM — M16.9 OA (OSTEOARTHRITIS) OF HIP: Status: RESOLVED | Noted: 2022-09-01 | Resolved: 2025-08-22

## 2025-08-22 PROBLEM — M15.0 PRIMARY OSTEOARTHRITIS INVOLVING MULTIPLE JOINTS: Status: ACTIVE | Noted: 2025-08-22

## 2025-08-22 PROBLEM — F43.10 POSTTRAUMATIC STRESS DISORDER: Status: ACTIVE | Noted: 2017-02-09

## 2025-08-22 RX ORDER — ASPIRIN 81 MG/1
81 TABLET ORAL DAILY
COMMUNITY

## 2025-08-22 RX ORDER — LOPERAMIDE HYDROCHLORIDE 2 MG/1
2 CAPSULE ORAL 4 TIMES DAILY PRN
COMMUNITY

## 2025-08-22 RX ORDER — AMLODIPINE BESYLATE 5 MG/1
5 TABLET ORAL DAILY
COMMUNITY

## 2025-08-22 RX ORDER — POTASSIUM CHLORIDE 750 MG/1
10 CAPSULE, EXTENDED RELEASE ORAL 2 TIMES DAILY
COMMUNITY

## 2025-08-25 RX ORDER — AMITRIPTYLINE HYDROCHLORIDE 50 MG/1
50 TABLET ORAL DAILY
Qty: 30 TABLET | Refills: 1 | Status: SHIPPED | OUTPATIENT
Start: 2025-08-25

## 2025-08-28 ENCOUNTER — LAB (OUTPATIENT)
Dept: LAB | Facility: HOSPITAL | Age: 75
End: 2025-08-28
Payer: MEDICARE

## 2025-08-28 DIAGNOSIS — Z00.00 WELL ADULT HEALTH CHECK: ICD-10-CM

## 2025-08-28 DIAGNOSIS — E55.9 VITAMIN D DEFICIENCY: ICD-10-CM

## 2025-08-28 DIAGNOSIS — I63.9 CEREBROVASCULAR ACCIDENT (CVA), UNSPECIFIED MECHANISM: ICD-10-CM

## 2025-08-28 DIAGNOSIS — I10 ESSENTIAL HYPERTENSION: ICD-10-CM

## 2025-08-28 DIAGNOSIS — R73.01 IMPAIRED FASTING GLUCOSE: ICD-10-CM

## 2025-08-28 LAB
25(OH)D3 SERPL-MCNC: 14.7 NG/ML (ref 30–100)
BACTERIA UR QL AUTO: ABNORMAL /HPF
BASOPHILS # BLD AUTO: 0.03 10*3/MM3 (ref 0–0.2)
BASOPHILS NFR BLD AUTO: 0.5 % (ref 0–1.5)
BILIRUB UR QL STRIP: NEGATIVE
CHOLEST SERPL-MCNC: 206 MG/DL (ref 0–200)
CLARITY UR: CLEAR
COLOR UR: YELLOW
DEPRECATED RDW RBC AUTO: 41.7 FL (ref 37–54)
EOSINOPHIL # BLD AUTO: 0.02 10*3/MM3 (ref 0–0.4)
EOSINOPHIL NFR BLD AUTO: 0.3 % (ref 0.3–6.2)
ERYTHROCYTE [DISTWIDTH] IN BLOOD BY AUTOMATED COUNT: 12.8 % (ref 12.3–15.4)
FERRITIN SERPL-MCNC: 190 NG/ML (ref 13–150)
FOLATE SERPL-MCNC: 8.19 NG/ML (ref 4.78–24.2)
GLUCOSE UR STRIP-MCNC: NEGATIVE MG/DL
HBA1C MFR BLD: 5.7 % (ref 4.8–5.6)
HCT VFR BLD AUTO: 35.1 % (ref 34–46.6)
HCV AB SER QL: NORMAL
HDLC SERPL-MCNC: 60 MG/DL (ref 40–60)
HGB BLD-MCNC: 11.8 G/DL (ref 12–15.9)
HGB UR QL STRIP.AUTO: ABNORMAL
HOLD SPECIMEN: NORMAL
HYALINE CASTS UR QL AUTO: ABNORMAL /LPF
IMM GRANULOCYTES # BLD AUTO: 0.02 10*3/MM3 (ref 0–0.05)
IMM GRANULOCYTES NFR BLD AUTO: 0.3 % (ref 0–0.5)
IRON 24H UR-MRATE: 61 MCG/DL (ref 37–145)
IRON SATN MFR SERPL: 20 % (ref 20–50)
KETONES UR QL STRIP: NEGATIVE
LDLC SERPL CALC-MCNC: 138 MG/DL (ref 0–100)
LDLC/HDLC SERPL: 2.29 {RATIO}
LEUKOCYTE ESTERASE UR QL STRIP.AUTO: ABNORMAL
LYMPHOCYTES # BLD AUTO: 1.85 10*3/MM3 (ref 0.7–3.1)
LYMPHOCYTES NFR BLD AUTO: 32 % (ref 19.6–45.3)
MAGNESIUM SERPL-MCNC: 1.9 MG/DL (ref 1.6–2.4)
MCH RBC QN AUTO: 30.3 PG (ref 26.6–33)
MCHC RBC AUTO-ENTMCNC: 33.6 G/DL (ref 31.5–35.7)
MCV RBC AUTO: 90.2 FL (ref 79–97)
MONOCYTES # BLD AUTO: 0.31 10*3/MM3 (ref 0.1–0.9)
MONOCYTES NFR BLD AUTO: 5.4 % (ref 5–12)
NEUTROPHILS NFR BLD AUTO: 3.56 10*3/MM3 (ref 1.7–7)
NEUTROPHILS NFR BLD AUTO: 61.5 % (ref 42.7–76)
NITRITE UR QL STRIP: NEGATIVE
NRBC BLD AUTO-RTO: 0 /100 WBC (ref 0–0.2)
PH UR STRIP.AUTO: 6.5 [PH] (ref 5–8)
PLATELET # BLD AUTO: 376 10*3/MM3 (ref 140–450)
PMV BLD AUTO: 11.5 FL (ref 6–12)
PROT UR QL STRIP: ABNORMAL
RBC # BLD AUTO: 3.89 10*6/MM3 (ref 3.77–5.28)
RBC # UR STRIP: ABNORMAL /HPF
REF LAB TEST METHOD: ABNORMAL
SP GR UR STRIP: 1.01 (ref 1–1.03)
SQUAMOUS #/AREA URNS HPF: ABNORMAL /HPF
T4 FREE SERPL-MCNC: 1.05 NG/DL (ref 0.92–1.68)
TIBC SERPL-MCNC: 310 MCG/DL (ref 298–536)
TRANSFERRIN SERPL-MCNC: 208 MG/DL (ref 200–360)
TRIGL SERPL-MCNC: 44 MG/DL (ref 0–150)
TSH SERPL DL<=0.05 MIU/L-ACNC: 0.79 UIU/ML (ref 0.27–4.2)
UROBILINOGEN UR QL STRIP: ABNORMAL
VIT B12 BLD-MCNC: 395 PG/ML (ref 211–946)
VLDLC SERPL-MCNC: 8 MG/DL (ref 5–40)
WBC # UR STRIP: ABNORMAL /HPF
WBC NRBC COR # BLD AUTO: 5.79 10*3/MM3 (ref 3.4–10.8)

## 2025-08-28 PROCEDURE — 82607 VITAMIN B-12: CPT

## 2025-08-28 PROCEDURE — 81001 URINALYSIS AUTO W/SCOPE: CPT

## 2025-08-28 PROCEDURE — 82746 ASSAY OF FOLIC ACID SERUM: CPT

## 2025-08-28 PROCEDURE — 36415 COLL VENOUS BLD VENIPUNCTURE: CPT

## 2025-08-28 PROCEDURE — 82728 ASSAY OF FERRITIN: CPT

## 2025-08-28 PROCEDURE — 83540 ASSAY OF IRON: CPT

## 2025-08-28 PROCEDURE — 84439 ASSAY OF FREE THYROXINE: CPT

## 2025-08-28 PROCEDURE — 84443 ASSAY THYROID STIM HORMONE: CPT

## 2025-08-28 PROCEDURE — 80061 LIPID PANEL: CPT

## 2025-08-28 PROCEDURE — 84466 ASSAY OF TRANSFERRIN: CPT

## 2025-08-28 PROCEDURE — 82306 VITAMIN D 25 HYDROXY: CPT

## 2025-08-28 PROCEDURE — 83036 HEMOGLOBIN GLYCOSYLATED A1C: CPT

## 2025-08-28 PROCEDURE — 83735 ASSAY OF MAGNESIUM: CPT

## 2025-08-28 PROCEDURE — 86803 HEPATITIS C AB TEST: CPT

## 2025-08-28 PROCEDURE — 85025 COMPLETE CBC W/AUTO DIFF WBC: CPT

## (undated) DEVICE — 3M™ STERI-DRAPE™ U-DRAPE 1015: Brand: STERI-DRAPE™

## (undated) DEVICE — TOWEL,OR,DSP,ST,BLUE,STD,4/PK,20PK/CS: Brand: MEDLINE

## (undated) DEVICE — SUT VIC 2/0 CT1 36IN

## (undated) DEVICE — ANTIBACTERIAL VIOLET BRAIDED (POLYGLACTIN 910), SYNTHETIC ABSORBABLE SURGICAL SUTURE: Brand: COATED VICRYL

## (undated) DEVICE — ELECTRD BLD EDGE COAT 3IN

## (undated) DEVICE — GLV SURG SENSICARE PI PF LF 7 GRN STRL

## (undated) DEVICE — PULLOVER TOGA, 2X LARGE: Brand: FLYTE, SURGICOOL

## (undated) DEVICE — SOL IRR NACL 0.9PCT BT 1000ML

## (undated) DEVICE — STERILE POLYISOPRENE POWDER-FREE SURGICAL GLOVES: Brand: PROTEXIS

## (undated) DEVICE — PEEL-AWAY TOGA, 2X LARGE: Brand: FLYTE

## (undated) DEVICE — BIPOLAR SEALER 23-112-1 AQM 6.0: Brand: AQUAMANTYS™

## (undated) DEVICE — KT PT POSITION SUPINE HANA/PROFX TABL

## (undated) DEVICE — MINOR-LF: Brand: MEDLINE INDUSTRIES, INC.

## (undated) DEVICE — STRAP STIRUP SLP RNG 19X3.5IN DISP

## (undated) DEVICE — APPL CHLORAPREP HI/LITE 26ML ORNG

## (undated) DEVICE — PENCL E/S SMOKEEVAC W/TELESCP CANN

## (undated) DEVICE — GLV SURG SENSICARE SLT PF LF 8.5 STRL

## (undated) DEVICE — MAT FLR ABS W/BLU/LINER 56X72IN WHT

## (undated) DEVICE — GAUZE,SPONGE,4"X4",16PLY,STRL,LF,10/TRAY: Brand: MEDLINE

## (undated) DEVICE — PROXIMATE RH ROTATING HEAD SKIN STAPLERS (35 WIDE) CONTAINS 35 STAINLESS STEEL STAPLES: Brand: PROXIMATE

## (undated) DEVICE — GLV SURG SENSICARE PI ORTHO SZ8 LF STRL

## (undated) DEVICE — GLV SURG SENSICARE SLT PF LF 7 STRL

## (undated) DEVICE — TOTAL ANTERIOR HIP-LF: Brand: MEDLINE INDUSTRIES, INC.

## (undated) DEVICE — RNG O HMROID LF PK/100

## (undated) DEVICE — DRAPE UNDERBUTTOCKS W FLUID POUCH 40X44IN

## (undated) DEVICE — LEGGINGS, PAIR, 31X48, STERILE: Brand: MEDLINE

## (undated) DEVICE — CVR LEG BOOTLEG F/R NOSKID 33IN

## (undated) DEVICE — SLV SCD KN/LEN ADJ EXPRSS BLENDED MD 1P/U

## (undated) DEVICE — STRYKER PERFORMANCE SERIES SAGITTAL BLADE: Brand: STRYKER PERFORMANCE SERIES

## (undated) DEVICE — 450 ML BOTTLE OF 0.05% CHLORHEXIDINE GLUCONATE IN 99.95% STERILE WATER FOR IRRIGATION, USP AND APPLICATOR.: Brand: IRRISEPT ANTIMICROBIAL WOUND LAVAGE

## (undated) DEVICE — GLV SURG BIOGEL LTX PF 8 1/2

## (undated) DEVICE — Device

## (undated) DEVICE — GLV SURG SENSICARE SLT PF LF 6.5 STRL

## (undated) DEVICE — SKIN PREP TRAY W/CHG: Brand: MEDLINE INDUSTRIES, INC.

## (undated) DEVICE — GOWN,REINFRCE,POLY,SIRUS,BREATH SLV,XXLG: Brand: MEDLINE